# Patient Record
Sex: FEMALE | Race: WHITE | Employment: OTHER | URBAN - METROPOLITAN AREA
[De-identification: names, ages, dates, MRNs, and addresses within clinical notes are randomized per-mention and may not be internally consistent; named-entity substitution may affect disease eponyms.]

---

## 2019-10-19 ENCOUNTER — APPOINTMENT (OUTPATIENT)
Dept: LAB | Age: 64
End: 2019-10-19
Attending: FAMILY MEDICINE
Payer: MEDICARE

## 2019-10-19 ENCOUNTER — OFFICE VISIT (OUTPATIENT)
Dept: FAMILY MEDICINE CLINIC | Facility: CLINIC | Age: 64
End: 2019-10-19
Payer: MEDICARE

## 2019-10-19 VITALS
SYSTOLIC BLOOD PRESSURE: 125 MMHG | TEMPERATURE: 98 F | HEART RATE: 64 BPM | HEIGHT: 67 IN | BODY MASS INDEX: 27 KG/M2 | WEIGHT: 172 LBS | DIASTOLIC BLOOD PRESSURE: 71 MMHG

## 2019-10-19 DIAGNOSIS — Z00.00 WELCOME TO MEDICARE PREVENTIVE VISIT: Primary | ICD-10-CM

## 2019-10-19 DIAGNOSIS — E78.00 HYPERCHOLESTEREMIA: ICD-10-CM

## 2019-10-19 DIAGNOSIS — Z90.711 HISTORY OF PARTIAL HYSTERECTOMY: ICD-10-CM

## 2019-10-19 DIAGNOSIS — Z12.31 ENCOUNTER FOR SCREENING MAMMOGRAM FOR BREAST CANCER: ICD-10-CM

## 2019-10-19 DIAGNOSIS — Z13.6 SCREENING FOR CARDIOVASCULAR CONDITION: ICD-10-CM

## 2019-10-19 DIAGNOSIS — Z00.00 ENCOUNTER FOR ANNUAL HEALTH EXAMINATION: ICD-10-CM

## 2019-10-19 DIAGNOSIS — Z23 NEED FOR VACCINATION: ICD-10-CM

## 2019-10-19 DIAGNOSIS — Z11.59 NEED FOR HEPATITIS C SCREENING TEST: ICD-10-CM

## 2019-10-19 DIAGNOSIS — Z78.0 POSTMENOPAUSAL: ICD-10-CM

## 2019-10-19 DIAGNOSIS — Z00.00 WELCOME TO MEDICARE PREVENTIVE VISIT: ICD-10-CM

## 2019-10-19 DIAGNOSIS — I10 ESSENTIAL HYPERTENSION: ICD-10-CM

## 2019-10-19 DIAGNOSIS — Z12.11 SCREENING FOR COLON CANCER: ICD-10-CM

## 2019-10-19 DIAGNOSIS — E66.3 OVERWEIGHT (BMI 25.0-29.9): ICD-10-CM

## 2019-10-19 PROCEDURE — 80053 COMPREHEN METABOLIC PANEL: CPT

## 2019-10-19 PROCEDURE — 90662 IIV NO PRSV INCREASED AG IM: CPT | Performed by: FAMILY MEDICINE

## 2019-10-19 PROCEDURE — G0402 INITIAL PREVENTIVE EXAM: HCPCS | Performed by: FAMILY MEDICINE

## 2019-10-19 PROCEDURE — 90670 PCV13 VACCINE IM: CPT | Performed by: FAMILY MEDICINE

## 2019-10-19 PROCEDURE — 80061 LIPID PANEL: CPT

## 2019-10-19 PROCEDURE — 86803 HEPATITIS C AB TEST: CPT

## 2019-10-19 PROCEDURE — 36415 COLL VENOUS BLD VENIPUNCTURE: CPT

## 2019-10-19 PROCEDURE — G0008 ADMIN INFLUENZA VIRUS VAC: HCPCS | Performed by: FAMILY MEDICINE

## 2019-10-19 PROCEDURE — 93005 ELECTROCARDIOGRAM TRACING: CPT

## 2019-10-19 PROCEDURE — 93010 ELECTROCARDIOGRAM REPORT: CPT | Performed by: FAMILY MEDICINE

## 2019-10-19 PROCEDURE — G0009 ADMIN PNEUMOCOCCAL VACCINE: HCPCS | Performed by: FAMILY MEDICINE

## 2019-10-19 RX ORDER — KRILL/OM-3/DHA/EPA/PHOSPHO/AST 500-110 MG
CAPSULE ORAL
COMMUNITY

## 2019-10-19 RX ORDER — METOPROLOL SUCCINATE 100 MG/1
100 TABLET, EXTENDED RELEASE ORAL
Refills: 3 | COMMUNITY
Start: 2019-08-27 | End: 2019-10-23

## 2019-10-19 RX ORDER — ATORVASTATIN CALCIUM 20 MG/1
20 TABLET, FILM COATED ORAL
Refills: 3 | COMMUNITY
Start: 2019-08-27 | End: 2019-10-23

## 2019-10-19 NOTE — PROGRESS NOTES
HPI:   Cristian Ceballos  is a 70year old female who presents for a Medicare Initial Annual Wellness visit (Once after 12 month Medicare anniversary) .     New patient  Doing, worked as , retired at age 57  Moved to be closer to daughter Hypercholesteremia     Postmenopausal     History of partial hysterectomy    Wt Readings from Last 3 Encounters:  10/19/19 : 172 lb (78 kg)     Last Cholesterol Labs:   No results found for: CHOLEST, HDL, LDL, TRIG       Last Chemistry Labs:   No results f PERRL, conjunctiva/corneas clear, EOM's intact both eyes   Ears:  Normal TM's and external ear canals, both ears   Nose: Nares normal, septum midline,mucosa normal, no drainage or sinus tenderness   Throat: Lips, mucosa, and tongue normal; teeth and gums n Future    Overweight (BMI 25.0-29. 9)    Encounter for screening mammogram for breast cancer  -     [facility] JOELLEN 2D+3D SCREENING BILAT (CPT=77067/25206); Future    Postmenopausal  -     XR DEXA BONE DENSITOMETRY (CPT=77080);  Future    History of partial hystere without trying?: 2 - No  Has your appetite been poor?: No  How does the patient maintain a good energy level?: Daily Walks(and baby sitting 18 month [facility] )  How would you describe your daily physical activity?: Moderate  How would you describe your Maintenance if applicable     Immunizations (Update Immunization Activity if applicable)     Influenza  Covered Annually 10/19/2019 Please get every year    Pneumococcal 13 (Prevnar)  Covered Once after 65 No vaccine history found Please get once after you

## 2019-10-19 NOTE — PATIENT INSTRUCTIONS
Latha Schaeffer 's SCREENING SCHEDULE   Tests on this list are recommended by your physician but may not be covered, or covered at this frequency, by your insurer. Please check with your insurance carrier before scheduling to verify coverage.    Lonnie Rojas found for this or any previous visit. No flowsheet data found. Fecal Occult Blood   Covered Annually No results found for: FOB, OCCULTSTOOL No flowsheet data found.      Barium Enema-   uncomfortable but covered  Covered but uncomfortable   Glaucoma Scr Hepatitis B for Moderate/High Risk       No orders found for this or any previous visit.  Medium/high risk factors:   End-stage renal disease   Hemophiliacs who received Factor VIII or IX concentrates   Clients of institutions for the mentally retarded   Pe

## 2019-10-23 RX ORDER — ATORVASTATIN CALCIUM 20 MG/1
20 TABLET, FILM COATED ORAL
Qty: 90 TABLET | Refills: 3 | Status: SHIPPED | OUTPATIENT
Start: 2019-10-23 | End: 2020-10-03

## 2019-10-23 RX ORDER — METOPROLOL SUCCINATE 100 MG/1
100 TABLET, EXTENDED RELEASE ORAL
Qty: 90 TABLET | Refills: 3 | Status: SHIPPED | OUTPATIENT
Start: 2019-10-23 | End: 2020-10-03

## 2020-10-03 ENCOUNTER — OFFICE VISIT (OUTPATIENT)
Dept: FAMILY MEDICINE CLINIC | Facility: CLINIC | Age: 65
End: 2020-10-03
Payer: MEDICARE

## 2020-10-03 VITALS
SYSTOLIC BLOOD PRESSURE: 117 MMHG | WEIGHT: 180 LBS | DIASTOLIC BLOOD PRESSURE: 67 MMHG | TEMPERATURE: 98 F | HEIGHT: 67 IN | HEART RATE: 62 BPM | BODY MASS INDEX: 28.25 KG/M2

## 2020-10-03 DIAGNOSIS — Z12.11 COLON CANCER SCREENING: ICD-10-CM

## 2020-10-03 DIAGNOSIS — E78.00 HYPERCHOLESTEREMIA: ICD-10-CM

## 2020-10-03 DIAGNOSIS — Z78.0 POSTMENOPAUSAL: ICD-10-CM

## 2020-10-03 DIAGNOSIS — E66.3 OVERWEIGHT (BMI 25.0-29.9): ICD-10-CM

## 2020-10-03 DIAGNOSIS — Z12.31 VISIT FOR SCREENING MAMMOGRAM: ICD-10-CM

## 2020-10-03 DIAGNOSIS — I10 ESSENTIAL HYPERTENSION: ICD-10-CM

## 2020-10-03 DIAGNOSIS — Z90.711 HISTORY OF PARTIAL HYSTERECTOMY: ICD-10-CM

## 2020-10-03 DIAGNOSIS — Z23 NEED FOR PNEUMOCOCCAL VACCINATION: ICD-10-CM

## 2020-10-03 DIAGNOSIS — Z12.31 ENCOUNTER FOR SCREENING MAMMOGRAM FOR BREAST CANCER: Primary | ICD-10-CM

## 2020-10-03 DIAGNOSIS — Z00.00 ENCOUNTER FOR ANNUAL HEALTH EXAMINATION: ICD-10-CM

## 2020-10-03 PROCEDURE — 90732 PPSV23 VACC 2 YRS+ SUBQ/IM: CPT | Performed by: FAMILY MEDICINE

## 2020-10-03 PROCEDURE — G0009 ADMIN PNEUMOCOCCAL VACCINE: HCPCS | Performed by: FAMILY MEDICINE

## 2020-10-03 PROCEDURE — G0439 PPPS, SUBSEQ VISIT: HCPCS | Performed by: FAMILY MEDICINE

## 2020-10-03 RX ORDER — ATORVASTATIN CALCIUM 20 MG/1
20 TABLET, FILM COATED ORAL
Qty: 90 TABLET | Refills: 3 | Status: SHIPPED | OUTPATIENT
Start: 2020-10-03 | End: 2020-10-18 | Stop reason: DRUGHIGH

## 2020-10-03 RX ORDER — METOPROLOL SUCCINATE 100 MG/1
100 TABLET, EXTENDED RELEASE ORAL
Qty: 90 TABLET | Refills: 3 | Status: SHIPPED | OUTPATIENT
Start: 2020-10-03 | End: 2021-10-10

## 2020-10-03 NOTE — PATIENT INSTRUCTIONS
Bijan Arellano 's SCREENING SCHEDULE   Tests on this list are recommended by your physician but may not be covered, or covered at this frequency, by your insurer. Please check with your insurance carrier before scheduling to verify coverage.    PREVEN more often if abnormal Colonoscopy due on 12/29/2003 Update [facility] if applicable    Flex Sigmoidoscopy Screen  Covered every 5 years No results found for this or any previous visit. No flowsheet data found.      Fecal Occult Blood   Covered Catie birthday    Pneumococcal 23 (Pneumovax)  Covered Once after 65 No orders found for this or any previous visit. Please get once after your 65th birthday    Hepatitis B for Moderate/High Risk       No orders found for this or any previous visit.  Medium/high

## 2020-10-03 NOTE — PROGRESS NOTES
HPI:   Leslee Samaniego  is a 75year old female who presents for a Medicare Subsequent Annual Wellness visit (Pt already had Initial Annual Wellness).        Annual Physical due on 10/03/2021        Fall/Risk Assessment   She has been screened for Fall 94 10/19/2019          Last Chemistry Labs:   Lab Results   Component Value Date    AST 18 10/19/2019    ALT 27 10/19/2019    CA 9.7 10/19/2019    ALB 4.1 10/19/2019    CREATSERUM 0.70 10/19/2019     (H) 10/19/2019        CBC  (most recent labs)   N about where sounds come from: No I misunderstand some words in a sentence and need to ask people to repeat themselves: No   I especially have trouble understanding the speech of women and children: No I have trouble understanding the speaker in a large lisa color, texture, turgor normal, no rashes or lesions   Lymph nodes: Cervical, supraclavicular, and axillary nodes normal   Neurologic: Normal    and Breasts:  normal appearance, no masses or tenderness, Inspection negative, No nipple retraction or dimpling, cancer    - [facility] JOELLEN 2D+3D SCREENING BILAT (CPT=77067/88278); Future    5. Overweight (BMI 25.0-29. 9)  Highly recommend to lose weight. Discussed good dietary and eating habits as well as increasing vegetable and fruit intake.   Recommending avoiding foods patient  PREVENTATIVE SERVICES  INDICATIONS AND SCHEDULE Internal Lab or Procedure External Lab or Procedure   Diabetes Screening      HbgA1C   Annually No results found for: A1C No flowsheet data found.     Fasting Blood Sugar (FSB)Annually Glucose (mg/dL) Moderate/High Risk No vaccine history found Medium/high risk factors:   End-stage renal disease   Hemophiliacs who received Factor VIII or IX concentrates   Clients of institutions for the mentally retarded   Persons who live in the same house as a HepB vi

## 2020-10-12 ENCOUNTER — LAB ENCOUNTER (OUTPATIENT)
Dept: LAB | Age: 65
End: 2020-10-12
Attending: FAMILY MEDICINE
Payer: MEDICARE

## 2020-10-12 DIAGNOSIS — E78.00 HYPERCHOLESTEREMIA: ICD-10-CM

## 2020-10-12 DIAGNOSIS — I10 ESSENTIAL HYPERTENSION: ICD-10-CM

## 2020-10-12 PROCEDURE — 36415 COLL VENOUS BLD VENIPUNCTURE: CPT

## 2020-10-12 PROCEDURE — 80053 COMPREHEN METABOLIC PANEL: CPT

## 2020-10-12 PROCEDURE — 80061 LIPID PANEL: CPT

## 2020-10-18 DIAGNOSIS — R73.9 HYPERGLYCEMIA: Primary | ICD-10-CM

## 2020-10-18 DIAGNOSIS — E78.00 HYPERCHOLESTEREMIA: ICD-10-CM

## 2020-10-18 RX ORDER — ATORVASTATIN CALCIUM 10 MG/1
10 TABLET, FILM COATED ORAL NIGHTLY
Qty: 90 TABLET | Refills: 1 | Status: SHIPPED | OUTPATIENT
Start: 2020-10-18 | End: 2021-01-25

## 2020-10-20 ENCOUNTER — TELEPHONE (OUTPATIENT)
Dept: FAMILY MEDICINE CLINIC | Facility: CLINIC | Age: 65
End: 2020-10-20

## 2020-10-20 NOTE — TELEPHONE ENCOUNTER
Patient informed of results ( identified name and ) and recommendations, as per provider's result note. Patient voiced understanding and agrees.        Provided information to call Central scheduling at [phone] for fasting labs    Will  new R

## 2020-10-20 NOTE — TELEPHONE ENCOUNTER
Called pt but was not available wanted to inform her of her lab results     Liver kidney functions are good.    Cholesterol is excellent and I would recommend reducing atorvastatin dose to 10 mg once daily.  Fasting glucose is slightly elevated and I would

## 2021-01-18 ENCOUNTER — LAB ENCOUNTER (OUTPATIENT)
Dept: LAB | Age: 66
End: 2021-01-18
Attending: FAMILY MEDICINE
Payer: MEDICARE

## 2021-01-18 DIAGNOSIS — E78.00 HYPERCHOLESTEREMIA: ICD-10-CM

## 2021-01-18 DIAGNOSIS — R73.9 HYPERGLYCEMIA: ICD-10-CM

## 2021-01-18 LAB
ALBUMIN SERPL-MCNC: 4 G/DL (ref 3.4–5)
ALBUMIN/GLOB SERPL: 1.1 {RATIO} (ref 1–2)
ALP LIVER SERPL-CCNC: 61 U/L
ALT SERPL-CCNC: 26 U/L
ANION GAP SERPL CALC-SCNC: 3 MMOL/L (ref 0–18)
AST SERPL-CCNC: 15 U/L (ref 15–37)
BILIRUB SERPL-MCNC: 0.9 MG/DL (ref 0.1–2)
BUN BLD-MCNC: 20 MG/DL (ref 7–18)
BUN/CREAT SERPL: 25.6 (ref 10–20)
CALCIUM BLD-MCNC: 9.6 MG/DL (ref 8.5–10.1)
CHLORIDE SERPL-SCNC: 107 MMOL/L (ref 98–112)
CHOLEST SMN-MCNC: 129 MG/DL (ref ?–200)
CO2 SERPL-SCNC: 29 MMOL/L (ref 21–32)
CREAT BLD-MCNC: 0.78 MG/DL
EST. AVERAGE GLUCOSE BLD GHB EST-MCNC: 131 MG/DL (ref 68–126)
GLOBULIN PLAS-MCNC: 3.7 G/DL (ref 2.8–4.4)
GLUCOSE BLD-MCNC: 118 MG/DL (ref 70–99)
HBA1C MFR BLD HPLC: 6.2 % (ref ?–5.7)
HDLC SERPL-MCNC: 56 MG/DL (ref 40–59)
LDLC SERPL CALC-MCNC: 53 MG/DL (ref ?–100)
M PROTEIN MFR SERPL ELPH: 7.7 G/DL (ref 6.4–8.2)
NONHDLC SERPL-MCNC: 73 MG/DL (ref ?–130)
OSMOLALITY SERPL CALC.SUM OF ELEC: 292 MOSM/KG (ref 275–295)
PATIENT FASTING Y/N/NP: YES
PATIENT FASTING Y/N/NP: YES
POTASSIUM SERPL-SCNC: 4.3 MMOL/L (ref 3.5–5.1)
SODIUM SERPL-SCNC: 139 MMOL/L (ref 136–145)
TRIGL SERPL-MCNC: 100 MG/DL (ref 30–149)
VLDLC SERPL CALC-MCNC: 20 MG/DL (ref 0–30)

## 2021-01-18 PROCEDURE — 36415 COLL VENOUS BLD VENIPUNCTURE: CPT

## 2021-01-18 PROCEDURE — 83036 HEMOGLOBIN GLYCOSYLATED A1C: CPT

## 2021-01-18 PROCEDURE — 80061 LIPID PANEL: CPT

## 2021-01-18 PROCEDURE — 80053 COMPREHEN METABOLIC PANEL: CPT

## 2021-01-25 DIAGNOSIS — E78.00 HYPERCHOLESTEREMIA: ICD-10-CM

## 2021-01-25 RX ORDER — ATORVASTATIN CALCIUM 10 MG/1
10 TABLET, FILM COATED ORAL NIGHTLY
Qty: 90 TABLET | Refills: 3 | Status: SHIPPED | OUTPATIENT
Start: 2021-01-25

## 2021-10-09 DIAGNOSIS — I10 ESSENTIAL HYPERTENSION: ICD-10-CM

## 2021-10-10 RX ORDER — METOPROLOL SUCCINATE 100 MG/1
100 TABLET, EXTENDED RELEASE ORAL DAILY
Qty: 90 TABLET | Refills: 0 | Status: SHIPPED | OUTPATIENT
Start: 2021-10-10 | End: 2021-10-11

## 2021-10-11 ENCOUNTER — OFFICE VISIT (OUTPATIENT)
Dept: FAMILY MEDICINE CLINIC | Facility: CLINIC | Age: 66
End: 2021-10-11
Payer: MEDICARE

## 2021-10-11 ENCOUNTER — LAB ENCOUNTER (OUTPATIENT)
Dept: LAB | Age: 66
End: 2021-10-11
Attending: FAMILY MEDICINE
Payer: MEDICARE

## 2021-10-11 VITALS
SYSTOLIC BLOOD PRESSURE: 127 MMHG | BODY MASS INDEX: 28.25 KG/M2 | WEIGHT: 180 LBS | HEART RATE: 75 BPM | TEMPERATURE: 97 F | HEIGHT: 67 IN | DIASTOLIC BLOOD PRESSURE: 80 MMHG

## 2021-10-11 DIAGNOSIS — Z23 FLU VACCINE NEED: ICD-10-CM

## 2021-10-11 DIAGNOSIS — Z90.711 HISTORY OF PARTIAL HYSTERECTOMY: ICD-10-CM

## 2021-10-11 DIAGNOSIS — Z00.00 NORMAL BREAST EXAM: ICD-10-CM

## 2021-10-11 DIAGNOSIS — E78.00 HYPERCHOLESTEREMIA: ICD-10-CM

## 2021-10-11 DIAGNOSIS — E66.3 OVERWEIGHT (BMI 25.0-29.9): ICD-10-CM

## 2021-10-11 DIAGNOSIS — Z23 NEED FOR SHINGLES VACCINE: ICD-10-CM

## 2021-10-11 DIAGNOSIS — Z12.31 ENCOUNTER FOR SCREENING MAMMOGRAM FOR BREAST CANCER: ICD-10-CM

## 2021-10-11 DIAGNOSIS — R73.03 PREDIABETES: ICD-10-CM

## 2021-10-11 DIAGNOSIS — I10 ESSENTIAL HYPERTENSION: Primary | ICD-10-CM

## 2021-10-11 DIAGNOSIS — Z12.11 COLON CANCER SCREENING: ICD-10-CM

## 2021-10-11 DIAGNOSIS — Z00.00 ENCOUNTER FOR ANNUAL HEALTH EXAMINATION: ICD-10-CM

## 2021-10-11 DIAGNOSIS — Z78.0 POSTMENOPAUSAL: ICD-10-CM

## 2021-10-11 PROCEDURE — 80053 COMPREHEN METABOLIC PANEL: CPT

## 2021-10-11 PROCEDURE — 36415 COLL VENOUS BLD VENIPUNCTURE: CPT

## 2021-10-11 PROCEDURE — G0439 PPPS, SUBSEQ VISIT: HCPCS | Performed by: FAMILY MEDICINE

## 2021-10-11 PROCEDURE — 83036 HEMOGLOBIN GLYCOSYLATED A1C: CPT

## 2021-10-11 PROCEDURE — 80061 LIPID PANEL: CPT

## 2021-10-11 RX ORDER — METOPROLOL SUCCINATE 100 MG/1
100 TABLET, EXTENDED RELEASE ORAL DAILY
Qty: 90 TABLET | Refills: 3 | Status: SHIPPED | OUTPATIENT
Start: 2021-10-11

## 2021-10-11 NOTE — PATIENT INSTRUCTIONS
Kitty Ching 's SCREENING SCHEDULE   Tests on this list are recommended by your physician but may not be covered, or covered at this frequency, by your insurer. Please check with your insurance carrier before scheduling to verify coverage.    PREV done   Pap and Pelvic    Pap   Covered every 2 years for women at normal risk;  Annually if at high risk -  No recommendations at this time    Chlamydia Annually if high risk -  No recommendations at this time   Screening Mammogram    Mammogram     Recommen

## 2021-10-11 NOTE — PROGRESS NOTES
HPI:   Elyssa Bell  is a 77year old female who presents for a Medicare Subsequent Annual Wellness visit (Pt already had Initial Annual Wellness).     Doing well  [employer] grad daughter  Retired as    Annual Physical due on 10/11/20 Encounters:  10/11/21 : 180 lb (81.6 kg)  10/03/20 : 180 lb (81.6 kg)  10/19/19 : 172 lb (78 kg)     Last Cholesterol Labs:   Lab Results   Component Value Date    CHOLEST 129 01/18/2021    HDL 56 01/18/2021    LDL 53 01/18/2021    TRIG 100 01/18/2021 No I have to strain to understand conversations: No   I have to worry about missing the telephone ring or doorbell: No I have trouble hearing conversations in a noisy background such as a crowded room or restaurant: No   I get confused about where sounds c symmetric   Skin: Skin color, texture, turgor normal, no rashes or lesions   Lymph nodes: Cervical, supraclavicular, and axillary nodes normal   Neurologic: Normal    and Breasts:  normal appearance, no masses or tenderness, Inspection negative, No nipple Overweight (BMI 25.0-29. 9)  Highly recommend to lose weight. Discussed good dietary and eating habits as well as increasing vegetable and fruit intake. Recommending avoiding foods high in fat content.   Recommend exercising at least 30-40 minutes 5-6 days months if never tested or if previously tested but not diagnosed with pre-diabetes   One screening every 6 months if diagnosed with pre-diabetes Lab Results   Component Value Date     (H) 01/18/2021        Cardiovascular Disease Screening    Lipid P Influenza Covered once per flu season  Please get every year 09/06/2021  No recommendations at this time    Pneumococcal Each vaccine ([id number] & [id number]) covered once after 65 Prevnar 13: 10/19/2019    [id number]: 10/03/2020     No recommendation

## 2022-03-21 ENCOUNTER — OFFICE VISIT (OUTPATIENT)
Dept: FAMILY MEDICINE CLINIC | Facility: CLINIC | Age: 67
End: 2022-03-21
Payer: MEDICARE

## 2022-03-21 ENCOUNTER — LAB ENCOUNTER (OUTPATIENT)
Dept: LAB | Age: 67
End: 2022-03-21
Attending: FAMILY MEDICINE
Payer: MEDICARE

## 2022-03-21 VITALS
HEART RATE: 67 BPM | HEIGHT: 67 IN | SYSTOLIC BLOOD PRESSURE: 139 MMHG | BODY MASS INDEX: 27.15 KG/M2 | DIASTOLIC BLOOD PRESSURE: 76 MMHG | TEMPERATURE: 97 F | WEIGHT: 173 LBS

## 2022-03-21 DIAGNOSIS — E78.00 HYPERCHOLESTEREMIA: ICD-10-CM

## 2022-03-21 DIAGNOSIS — I10 ESSENTIAL HYPERTENSION: Primary | ICD-10-CM

## 2022-03-21 DIAGNOSIS — R73.03 PREDIABETES: ICD-10-CM

## 2022-03-21 DIAGNOSIS — N81.4 CYSTOCELE WITH PROLAPSE: ICD-10-CM

## 2022-03-21 DIAGNOSIS — I10 ESSENTIAL HYPERTENSION: ICD-10-CM

## 2022-03-21 DIAGNOSIS — E66.3 OVERWEIGHT (BMI 25.0-29.9): ICD-10-CM

## 2022-03-21 LAB
ALBUMIN SERPL-MCNC: 4.1 G/DL (ref 3.4–5)
ALBUMIN/GLOB SERPL: 1.2 {RATIO} (ref 1–2)
ALP LIVER SERPL-CCNC: 65 U/L
ALT SERPL-CCNC: 31 U/L
ANION GAP SERPL CALC-SCNC: 6 MMOL/L (ref 0–18)
AST SERPL-CCNC: 18 U/L (ref 15–37)
BILIRUB SERPL-MCNC: 0.9 MG/DL (ref 0.1–2)
BUN BLD-MCNC: 17 MG/DL (ref 7–18)
BUN/CREAT SERPL: 25.8 (ref 10–20)
CALCIUM BLD-MCNC: 9.5 MG/DL (ref 8.5–10.1)
CHLORIDE SERPL-SCNC: 107 MMOL/L (ref 98–112)
CHOLEST SERPL-MCNC: 146 MG/DL (ref ?–200)
CO2 SERPL-SCNC: 29 MMOL/L (ref 21–32)
CREAT BLD-MCNC: 0.66 MG/DL
EST. AVERAGE GLUCOSE BLD GHB EST-MCNC: 131 MG/DL (ref 68–126)
FASTING PATIENT LIPID ANSWER: YES
FASTING STATUS PATIENT QL REPORTED: YES
GLOBULIN PLAS-MCNC: 3.3 G/DL (ref 2.8–4.4)
GLUCOSE BLD-MCNC: 116 MG/DL (ref 70–99)
HBA1C MFR BLD: 6.2 % (ref ?–5.7)
HDLC SERPL-MCNC: 62 MG/DL (ref 40–59)
LDLC SERPL CALC-MCNC: 69 MG/DL (ref ?–100)
NONHDLC SERPL-MCNC: 84 MG/DL (ref ?–130)
OSMOLALITY SERPL CALC.SUM OF ELEC: 297 MOSM/KG (ref 275–295)
POTASSIUM SERPL-SCNC: 4.2 MMOL/L (ref 3.5–5.1)
PROT SERPL-MCNC: 7.4 G/DL (ref 6.4–8.2)
SODIUM SERPL-SCNC: 142 MMOL/L (ref 136–145)
TRIGL SERPL-MCNC: 80 MG/DL (ref 30–149)
VLDLC SERPL CALC-MCNC: 12 MG/DL (ref 0–30)

## 2022-03-21 PROCEDURE — 80053 COMPREHEN METABOLIC PANEL: CPT

## 2022-03-21 PROCEDURE — 36415 COLL VENOUS BLD VENIPUNCTURE: CPT

## 2022-03-21 PROCEDURE — 80061 LIPID PANEL: CPT

## 2022-03-21 PROCEDURE — 99214 OFFICE O/P EST MOD 30 MIN: CPT | Performed by: FAMILY MEDICINE

## 2022-03-21 PROCEDURE — 83036 HEMOGLOBIN GLYCOSYLATED A1C: CPT

## 2022-03-21 RX ORDER — ATORVASTATIN CALCIUM 10 MG/1
10 TABLET, FILM COATED ORAL NIGHTLY
Qty: 90 TABLET | Refills: 3 | Status: SHIPPED | OUTPATIENT
Start: 2022-03-21

## 2022-05-27 ENCOUNTER — OFFICE VISIT (OUTPATIENT)
Dept: UROLOGY | Facility: HOSPITAL | Age: 67
End: 2022-05-27
Attending: OBSTETRICS & GYNECOLOGY
Payer: MEDICARE

## 2022-05-27 VITALS — HEIGHT: 63 IN | WEIGHT: 173 LBS | RESPIRATION RATE: 20 BRPM | BODY MASS INDEX: 30.65 KG/M2

## 2022-05-27 DIAGNOSIS — N81.84 PELVIC MUSCLE WASTING: ICD-10-CM

## 2022-05-27 DIAGNOSIS — R35.1 NOCTURIA: ICD-10-CM

## 2022-05-27 DIAGNOSIS — N95.2 POSTMENOPAUSAL ATROPHIC VAGINITIS: ICD-10-CM

## 2022-05-27 DIAGNOSIS — N99.3 PROLAPSE OF VAGINAL VAULT AFTER HYSTERECTOMY: Primary | ICD-10-CM

## 2022-05-27 LAB
BILIRUB UR QL: NEGATIVE
CLARITY UR: CLEAR
COLOR UR: YELLOW
CONTROL RUN WITHIN 24 HOURS?: YES
GLUCOSE UR-MCNC: NEGATIVE MG/DL
KETONES UR-MCNC: NEGATIVE MG/DL
LEUKOCYTE ESTERASE UR QL STRIP.AUTO: NEGATIVE
LEUKOCYTE ESTERASE URINE: NEGATIVE
NITRITE UR QL STRIP.AUTO: NEGATIVE
NITRITE URINE: NEGATIVE
PH UR: 7 [PH] (ref 5–8)
PROT UR-MCNC: NEGATIVE MG/DL
SP GR UR STRIP: 1.01 (ref 1–1.03)
UROBILINOGEN UR STRIP-ACNC: <2
VIT C UR-MCNC: NEGATIVE MG/DL

## 2022-05-27 PROCEDURE — 81002 URINALYSIS NONAUTO W/O SCOPE: CPT | Performed by: OBSTETRICS & GYNECOLOGY

## 2022-05-27 PROCEDURE — 99202 OFFICE O/P NEW SF 15 MIN: CPT

## 2022-05-27 PROCEDURE — 81001 URINALYSIS AUTO W/SCOPE: CPT | Performed by: OBSTETRICS & GYNECOLOGY

## 2022-05-27 PROCEDURE — 87086 URINE CULTURE/COLONY COUNT: CPT | Performed by: OBSTETRICS & GYNECOLOGY

## 2022-05-27 PROCEDURE — 51701 INSERT BLADDER CATHETER: CPT | Performed by: OBSTETRICS & GYNECOLOGY

## 2022-05-30 ENCOUNTER — TELEPHONE (OUTPATIENT)
Dept: UROLOGY | Facility: HOSPITAL | Age: 67
End: 2022-05-30

## 2022-05-30 NOTE — TELEPHONE ENCOUNTER
TC to pt w/ test results   No answer, unable to leave message   Urine testing wnl, will inform pt at follow up visit

## 2022-08-25 ENCOUNTER — OFFICE VISIT (OUTPATIENT)
Dept: UROLOGY | Facility: HOSPITAL | Age: 67
End: 2022-08-25
Payer: MEDICARE

## 2022-08-25 VITALS — WEIGHT: 173 LBS | HEIGHT: 63 IN | BODY MASS INDEX: 30.65 KG/M2 | RESPIRATION RATE: 18 BRPM

## 2022-08-25 DIAGNOSIS — R35.1 NOCTURIA: ICD-10-CM

## 2022-08-25 DIAGNOSIS — N99.3 PROLAPSE OF VAGINAL VAULT AFTER HYSTERECTOMY: Primary | ICD-10-CM

## 2022-08-25 LAB
CONTROL RUN WITHIN 24 HOURS?: YES
LEUKOCYTE ESTERASE URINE: NEGATIVE
NITRITE URINE: NEGATIVE

## 2022-08-25 PROCEDURE — 51729 CYSTOMETROGRAM W/VP&UP: CPT

## 2022-08-25 PROCEDURE — 51741 ELECTRO-UROFLOWMETRY FIRST: CPT

## 2022-08-25 PROCEDURE — 51797 INTRAABDOMINAL PRESSURE TEST: CPT

## 2022-08-25 PROCEDURE — 81002 URINALYSIS NONAUTO W/O SCOPE: CPT

## 2022-08-25 PROCEDURE — 51784 ANAL/URINARY MUSCLE STUDY: CPT

## 2022-08-25 NOTE — PROCEDURES
Patient here for urodynamic testing. Procedure explained and confirmed by patient. See evaluation form for results. Both verbal and written discharge instructions were given. Patient tolerated procedure well and will follow up with Dr. Stephan Boland in office on  @  8:30 am.    URODYNAMIC EVALUATION    PATIENT HISTORY:    Prolapse:  Yes (patient biggest bother- splints to void)  MALU:  No  UUI:  Yes (patient reports leakage when getting up in am)  Nocturia:  1  Frequency:  >3 hours  Incomplete Emptying:  No  Constipation:  No (Bowel regimen discussed and handout given)  Last void prior to UDS testin hours   Current urge to void? Moderate  OAB meds stopped prior to test?  NA  Other symptoms? Surgery? 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 EXAM:  Urinalysis Dip: Blood trace,Nitrates -negative, leukocytes -negative                    Urovesico Junction ( >30 degrees ):  [x]  Mobile  []  Fixed    Perineal Sensation:  [x]  Normal  []  Abnormal    Additional Notes:    PROLAPSE (past introitus):  [x]  Yes  []  No  Prolapse reduced for testing?   [x]  Yes  []  No  [x]  Pessary #2 ring with support   [x]  Half  Speculum  []  Proctoswab  []  Vag Packing    Additional Notes:    UROFLOWMETRY:  Voided Volume:                129          mL  Maximum Flow Rate:                     23        mL/sec  Average flow rate:                    1          mL/sec  Post-void Residual:                80           mL  Pattern:  [x]  Normal  []  Poor flow     [x]  Intermittent  []  Other  Void:   [x]  Typical  []  Atypical    Additional Notes:Uroflow obtained unreduced   CYSTOMETRY:  Urethral Catheter:  Fr 7 / tdoc  Abd Catheter:     Fr 7 / tdoc   Infusion:  Water Rate 50 mL/min  Temp:  Room  Position:  [x] Sit  []  Stand  []  Supine  First sensation:   25 mL  First desire to void:   210 mL  Strong desire to void:  339 mL  Maximum cystometric capacity:   350 mL  Detrusor Activity:  [x]  Unstable   []  Stable  Urge leakage? []  Yes [x]  No  Volume at 1st unhibited detrusor cont:  184 mL  Detrusor instability provoked by:    [x]  Spontaneous []  Coughing  [x]  Filling  []  Valsalva  []  Other    Additional Notes:      URETHRAL FUNCTION:  Valsava (vesical) Leak Point Pressures:    Volume Leak Point Pressure Leak? Cough Valsalva      100mL 106 85    cm H2O []  Yes [x]  No         REDUCED:half speculum  Volume Leak Point Pressure Leak? Cough Valsalva      100mL 132 102    cm H2O []  Yes [x]  No   200mL 127  100   cm H2O []  Yes [x]  No   300mL 119 100    cm H2O []  Yes [x]  No   350mL 140 104  cm H2O []  Yes [x]  No     Genuine Stress Incontinence demonstrated? []  Yes  [x]  No    Resting Urethral Pressure Profile:     Functional Urethral Length:    0.4 cm         0.3    cm     Maximum UCP:          29 cm            26 cm       PRESSURE/FLOW STUDY:  Voided volume:   336     mL  Maximum flow rate:      28  mL/sec  Pressure Detrusor (at maximum flow):     38       cm H2O  Post void residual:        6       mL  Voiding mechanism:  []  Abnormal  [x]  Normal  []  Strain to void   []  Weak detrusor  Void:   [x]  Typical   []  Atypical    Additional Notes:Pressure flow study obtained reduced with #2 ring with support. #2 ring with support removed at completion of UDS testing.    EMG:  [x]  Reactive []  Non-Reactive    8/25/2022 9:26 AM     PERFORMED BY:  Mia Gomez RN        URODYNAMIC PHYSICIAN INTERPRETATION    IMPRESSION:   129/80cc & 336/6cc   FCI 350cc  DO @ 184cc  No MALU    Post-Procedure Diagnoses: Detrusor instability [N31.9]    Comments:      Momo Garcia DO   8/25/2022   10:26 AM

## 2022-09-30 ENCOUNTER — OFFICE VISIT (OUTPATIENT)
Dept: UROLOGY | Facility: HOSPITAL | Age: 67
End: 2022-09-30
Attending: OBSTETRICS & GYNECOLOGY

## 2022-09-30 VITALS — HEIGHT: 63 IN | BODY MASS INDEX: 30.65 KG/M2 | WEIGHT: 173 LBS | RESPIRATION RATE: 18 BRPM

## 2022-09-30 DIAGNOSIS — N81.85 CERVICAL STUMP PROLAPSE: ICD-10-CM

## 2022-09-30 DIAGNOSIS — N81.84 PELVIC MUSCLE WASTING: ICD-10-CM

## 2022-09-30 DIAGNOSIS — N99.3 PROLAPSE OF VAGINAL VAULT AFTER HYSTERECTOMY: Primary | ICD-10-CM

## 2022-09-30 DIAGNOSIS — N95.2 POSTMENOPAUSAL ATROPHIC VAGINITIS: ICD-10-CM

## 2022-09-30 PROCEDURE — 57160 INSERT PESSARY/OTHER DEVICE: CPT | Performed by: OBSTETRICS & GYNECOLOGY

## 2022-09-30 PROCEDURE — 99212 OFFICE O/P EST SF 10 MIN: CPT

## 2022-09-30 RX ORDER — ESTRADIOL 0.1 MG/G
CREAM VAGINAL
Qty: 42.5 G | Refills: 3 | Status: SHIPPED | OUTPATIENT
Start: 2022-09-30

## 2022-09-30 NOTE — PROGRESS NOTES
Pt here for pessary trial due to bulge  Wants to exhaust conservative options  Denies  UUI complaints  Denies MALU complaints  No UTIs  Nocturia x 1  Bowels regular      She is not currently interested in surgical management of her pelvic organ prolapse. Wants to try office mgmt for pessary care    Vitals:   09/30/22  0816   Resp: 18       GENERAL EXAM:  GENERAL:  NAD  HEAD: NC/AT  EYES: symmetric bilaterally. No icterus. Sclera w/o injection  NECK: no masses  LUNGS:  Normal resp effort  ABDOMEN:  Soft, no mass  MUSK: normal gait, ROM wnl. No edema  PSYCH: A&Ox3. Recent and remote memory intact    PELVIC EXAM:  Ext. Gen: +atrophy, no lesions, diffuse erythema  Urethra: +atrophy, nontender  Bladder:+fullness, nontender  Vagina: +atrophy  Cervix: no bleeding, no lesions, nontender  Uterus: absent  Perineum: nontender  Anus: wnl  Rectum: defer    PELVIC SUPPORT:  [place]:  3 (cervix to introitus)  Ant:  3  Post:  2    A #3 longstem gellhorn was placed without difficulty. 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 Discussed pessary management including benefits and risks. Discussed importance of keeping regularly scheduled pessary checks in prevention of complications related to pessary use. Discussed mgmt of vulvovaginal atrophy with vaginal estrogen cream. Reviewed associated benefits, risks, alternatives, and goals.  Recommend low dose twice weekly mgmt     Continue pessary management, office care  Begin vaginal estrogen cream twice weekly  Daily pelvic exercises  Call with s/sx of UTI, problems with pessary   All questions answered  She understands and agrees to plan    Return in about 2 weeks (around 10/14/2022) for pessary care, sooner prn .     Radha Junior PA-C

## 2022-09-30 NOTE — PROGRESS NOTES
Pt presents w/ initial c/o bulge  Urodynamic testing undergone without complication. Results reviewed with patient  129/80cc & 336/6cc   senior care 350cc  DO @ 184cc  No MALU    Discussed with patient mgmt options for POP, DO/UUI  Biggest bother is bulge  Interested in conservative options  Tried pessary in the past, poor fit    Discussed dietary and behavioral modifications for mgmt of urinary symptoms  Discussed weight management and benefits of weight loss on urinary symptoms  Reviewed AUA/SUFU guidelines on mgmt of non-neurogenic OAB  Discussed pharmacologic and nonpharmacologic mgmt options of urinary symptoms - reviewed risks, benefits, alternatives, and goals of treatment  Discussed specific risks related to OAB meds including, but not limited to dry mouth, constipation, blurry vision, cognitive changes, and BP elevation. Thorough discussion of surgical risks, benefits, and alternatives including, but not limited to bleeding/clots, infection, injury to nearby organs (urethra, bladder, ureters, bowel, blood vessels), mesh erosion, dyspareunia, de tina UUI, worsening MALU, recurrence, voiding dysfunction, and pain. All questions answered.   Pt will proceed bladder diet/drill, pessary trial  Vag estrogen twice weekly    Follow up pessary care    Dr. Lm Kinney

## 2022-09-30 NOTE — PATIENT INSTRUCTIONS
PAT [address] [place] [address] PELVIC MEDICINE    Fingertip Application Method for Estrogen Vaginal Cream    1. Wash you hands with soap and water and dry thoroughly. 2.  Squeeze out enough cream from the tube to cover 1/2 of your index finger. (See figure 1)    3. Locate the vaginal opening (See figure 2). Immediately above the vagina is the urethra (a small opening where urine is eliminated from your body). The urethra may not be as easily identified as the vagina because the opening is much smaller, however, use the diagram to determine its approximate location. 4.  Carefully spread the cream onto the external vaginal/urethral area (See figure 2). As the cream is spread, some may be gently inserted into the vagina; however, it is not necessary to push the cream high into your vagina.

## 2022-10-10 ENCOUNTER — LAB ENCOUNTER (OUTPATIENT)
Dept: LAB | Age: 67
End: 2022-10-10
Attending: FAMILY MEDICINE
Payer: MEDICARE

## 2022-10-10 ENCOUNTER — OFFICE VISIT (OUTPATIENT)
Dept: FAMILY MEDICINE CLINIC | Facility: CLINIC | Age: 67
End: 2022-10-10
Payer: MEDICARE

## 2022-10-10 VITALS
BODY MASS INDEX: 29.59 KG/M2 | WEIGHT: 167 LBS | HEART RATE: 61 BPM | TEMPERATURE: 96 F | DIASTOLIC BLOOD PRESSURE: 68 MMHG | HEIGHT: 63 IN | SYSTOLIC BLOOD PRESSURE: 113 MMHG

## 2022-10-10 DIAGNOSIS — Z23 NEED FOR INFLUENZA VACCINATION: ICD-10-CM

## 2022-10-10 DIAGNOSIS — E78.00 HYPERCHOLESTEREMIA: ICD-10-CM

## 2022-10-10 DIAGNOSIS — I10 ESSENTIAL HYPERTENSION: Primary | ICD-10-CM

## 2022-10-10 DIAGNOSIS — R73.03 PREDIABETES: ICD-10-CM

## 2022-10-10 DIAGNOSIS — E66.3 OVERWEIGHT (BMI 25.0-29.9): ICD-10-CM

## 2022-10-10 DIAGNOSIS — I10 ESSENTIAL HYPERTENSION: ICD-10-CM

## 2022-10-10 LAB
ALBUMIN SERPL-MCNC: 3.9 G/DL (ref 3.4–5)
ALBUMIN/GLOB SERPL: 1 {RATIO} (ref 1–2)
ALP LIVER SERPL-CCNC: 62 U/L
ALT SERPL-CCNC: 24 U/L
ANION GAP SERPL CALC-SCNC: 11 MMOL/L (ref 0–18)
AST SERPL-CCNC: 19 U/L (ref 15–37)
BILIRUB SERPL-MCNC: 0.9 MG/DL (ref 0.1–2)
BUN BLD-MCNC: 19 MG/DL (ref 7–18)
BUN/CREAT SERPL: 27.5 (ref 10–20)
CALCIUM BLD-MCNC: 9.4 MG/DL (ref 8.5–10.1)
CHLORIDE SERPL-SCNC: 108 MMOL/L (ref 98–112)
CHOLEST SERPL-MCNC: 147 MG/DL (ref ?–200)
CO2 SERPL-SCNC: 23 MMOL/L (ref 21–32)
CREAT BLD-MCNC: 0.69 MG/DL
EST. AVERAGE GLUCOSE BLD GHB EST-MCNC: 120 MG/DL (ref 68–126)
FASTING PATIENT LIPID ANSWER: YES
FASTING STATUS PATIENT QL REPORTED: YES
GFR SERPLBLD BASED ON 1.73 SQ M-ARVRAT: 94 ML/MIN/1.73M2 (ref 60–?)
GLOBULIN PLAS-MCNC: 3.8 G/DL (ref 2.8–4.4)
GLUCOSE BLD-MCNC: 104 MG/DL (ref 70–99)
HBA1C MFR BLD: 5.8 % (ref ?–5.7)
HDLC SERPL-MCNC: 59 MG/DL (ref 40–59)
LDLC SERPL CALC-MCNC: 73 MG/DL (ref ?–100)
NONHDLC SERPL-MCNC: 88 MG/DL (ref ?–130)
OSMOLALITY SERPL CALC.SUM OF ELEC: 297 MOSM/KG (ref 275–295)
POTASSIUM SERPL-SCNC: 4 MMOL/L (ref 3.5–5.1)
PROT SERPL-MCNC: 7.7 G/DL (ref 6.4–8.2)
SODIUM SERPL-SCNC: 142 MMOL/L (ref 136–145)
TRIGL SERPL-MCNC: 78 MG/DL (ref 30–149)
VLDLC SERPL CALC-MCNC: 12 MG/DL (ref 0–30)

## 2022-10-10 PROCEDURE — 99214 OFFICE O/P EST MOD 30 MIN: CPT | Performed by: FAMILY MEDICINE

## 2022-10-10 PROCEDURE — 36415 COLL VENOUS BLD VENIPUNCTURE: CPT

## 2022-10-10 PROCEDURE — 80061 LIPID PANEL: CPT

## 2022-10-10 PROCEDURE — 80053 COMPREHEN METABOLIC PANEL: CPT

## 2022-10-10 PROCEDURE — G0008 ADMIN INFLUENZA VIRUS VAC: HCPCS | Performed by: FAMILY MEDICINE

## 2022-10-10 PROCEDURE — 90662 IIV NO PRSV INCREASED AG IM: CPT | Performed by: FAMILY MEDICINE

## 2022-10-10 PROCEDURE — 83036 HEMOGLOBIN GLYCOSYLATED A1C: CPT

## 2022-10-10 RX ORDER — METOPROLOL SUCCINATE 100 MG/1
100 TABLET, EXTENDED RELEASE ORAL DAILY
Qty: 90 TABLET | Refills: 3 | Status: SHIPPED | OUTPATIENT
Start: 2022-10-10

## 2022-10-18 DIAGNOSIS — E78.00 HYPERCHOLESTEREMIA: ICD-10-CM

## 2022-10-18 RX ORDER — ATORVASTATIN CALCIUM 10 MG/1
10 TABLET, FILM COATED ORAL NIGHTLY
Qty: 90 TABLET | Refills: 3 | Status: SHIPPED | OUTPATIENT
Start: 2022-10-18

## 2022-10-21 ENCOUNTER — OFFICE VISIT (OUTPATIENT)
Dept: UROLOGY | Facility: HOSPITAL | Age: 67
End: 2022-10-21
Attending: STUDENT IN AN ORGANIZED HEALTH CARE EDUCATION/TRAINING PROGRAM
Payer: MEDICARE

## 2022-10-21 VITALS — BODY MASS INDEX: 29.59 KG/M2 | RESPIRATION RATE: 18 BRPM | WEIGHT: 167 LBS | HEIGHT: 63 IN

## 2022-10-21 DIAGNOSIS — N81.85 CERVICAL STUMP PROLAPSE: ICD-10-CM

## 2022-10-21 DIAGNOSIS — N95.2 POSTMENOPAUSAL ATROPHIC VAGINITIS: ICD-10-CM

## 2022-10-21 DIAGNOSIS — N81.84 PELVIC MUSCLE WASTING: ICD-10-CM

## 2022-10-21 DIAGNOSIS — N99.3 PROLAPSE OF VAGINAL VAULT AFTER HYSTERECTOMY: Primary | ICD-10-CM

## 2022-10-21 PROCEDURE — 99212 OFFICE O/P EST SF 10 MIN: CPT

## 2022-11-18 ENCOUNTER — OFFICE VISIT (OUTPATIENT)
Dept: UROLOGY | Facility: HOSPITAL | Age: 67
End: 2022-11-18
Attending: STUDENT IN AN ORGANIZED HEALTH CARE EDUCATION/TRAINING PROGRAM
Payer: MEDICARE

## 2022-11-18 VITALS
HEIGHT: 63 IN | SYSTOLIC BLOOD PRESSURE: 130 MMHG | BODY MASS INDEX: 29.59 KG/M2 | RESPIRATION RATE: 18 BRPM | DIASTOLIC BLOOD PRESSURE: 62 MMHG | WEIGHT: 167 LBS

## 2022-11-18 DIAGNOSIS — N81.84 PELVIC MUSCLE WASTING: ICD-10-CM

## 2022-11-18 DIAGNOSIS — N99.3 PROLAPSE OF VAGINAL VAULT AFTER HYSTERECTOMY: Primary | ICD-10-CM

## 2022-11-18 DIAGNOSIS — N95.2 POSTMENOPAUSAL ATROPHIC VAGINITIS: ICD-10-CM

## 2022-11-18 PROCEDURE — 99212 OFFICE O/P EST SF 10 MIN: CPT

## 2023-01-06 ENCOUNTER — OFFICE VISIT (OUTPATIENT)
Dept: UROLOGY | Facility: HOSPITAL | Age: 68
End: 2023-01-06
Attending: STUDENT IN AN ORGANIZED HEALTH CARE EDUCATION/TRAINING PROGRAM
Payer: MEDICARE

## 2023-01-06 VITALS — WEIGHT: 167 LBS | TEMPERATURE: 98 F | HEIGHT: 63 IN | BODY MASS INDEX: 29.59 KG/M2

## 2023-01-06 DIAGNOSIS — N95.2 POSTMENOPAUSAL ATROPHIC VAGINITIS: ICD-10-CM

## 2023-01-06 DIAGNOSIS — R35.1 NOCTURIA: ICD-10-CM

## 2023-01-06 DIAGNOSIS — N81.84 PELVIC MUSCLE WASTING: ICD-10-CM

## 2023-01-06 DIAGNOSIS — N99.3 PROLAPSE OF VAGINAL VAULT AFTER HYSTERECTOMY: Primary | ICD-10-CM

## 2023-01-06 PROCEDURE — 99212 OFFICE O/P EST SF 10 MIN: CPT | Performed by: PHYSICIAN ASSISTANT

## 2023-03-10 ENCOUNTER — OFFICE VISIT (OUTPATIENT)
Dept: UROLOGY | Facility: HOSPITAL | Age: 68
End: 2023-03-10
Attending: PHYSICIAN ASSISTANT
Payer: MEDICARE

## 2023-03-10 VITALS
WEIGHT: 167 LBS | BODY MASS INDEX: 29.59 KG/M2 | DIASTOLIC BLOOD PRESSURE: 70 MMHG | SYSTOLIC BLOOD PRESSURE: 120 MMHG | HEIGHT: 63 IN

## 2023-03-10 DIAGNOSIS — N81.84 PELVIC MUSCLE WASTING: ICD-10-CM

## 2023-03-10 DIAGNOSIS — R35.1 NOCTURIA: ICD-10-CM

## 2023-03-10 DIAGNOSIS — N99.3 PROLAPSE OF VAGINAL VAULT AFTER HYSTERECTOMY: ICD-10-CM

## 2023-03-10 DIAGNOSIS — N90.89 VULVAR IRRITATION: Primary | ICD-10-CM

## 2023-03-10 DIAGNOSIS — N95.2 POSTMENOPAUSAL ATROPHIC VAGINITIS: ICD-10-CM

## 2023-03-10 PROCEDURE — 99212 OFFICE O/P EST SF 10 MIN: CPT | Performed by: PHYSICIAN ASSISTANT

## 2023-03-10 RX ORDER — CLOTRIMAZOLE AND BETAMETHASONE DIPROPIONATE 10; .64 MG/G; MG/G
1 CREAM TOPICAL 2 TIMES DAILY
Qty: 45 G | Refills: 1 | Status: SHIPPED | OUTPATIENT
Start: 2023-03-10 | End: 2023-03-17

## 2023-05-12 ENCOUNTER — OFFICE VISIT (OUTPATIENT)
Dept: UROLOGY | Facility: HOSPITAL | Age: 68
End: 2023-05-12
Attending: PHYSICIAN ASSISTANT
Payer: MEDICARE

## 2023-05-12 VITALS — WEIGHT: 167 LBS | BODY MASS INDEX: 29.59 KG/M2 | HEIGHT: 63 IN | RESPIRATION RATE: 18 BRPM

## 2023-05-12 DIAGNOSIS — N99.3 PROLAPSE OF VAGINAL VAULT AFTER HYSTERECTOMY: Primary | ICD-10-CM

## 2023-05-12 DIAGNOSIS — R35.1 NOCTURIA: ICD-10-CM

## 2023-05-12 DIAGNOSIS — N95.2 POSTMENOPAUSAL ATROPHIC VAGINITIS: ICD-10-CM

## 2023-05-12 DIAGNOSIS — N81.84 PELVIC MUSCLE WASTING: ICD-10-CM

## 2023-05-12 PROCEDURE — 99212 OFFICE O/P EST SF 10 MIN: CPT

## 2023-09-01 ENCOUNTER — OFFICE VISIT (OUTPATIENT)
Dept: UROLOGY | Facility: HOSPITAL | Age: 68
End: 2023-09-01
Attending: PHYSICIAN ASSISTANT
Payer: MEDICARE

## 2023-09-01 VITALS
SYSTOLIC BLOOD PRESSURE: 110 MMHG | BODY MASS INDEX: 29.59 KG/M2 | HEIGHT: 63 IN | DIASTOLIC BLOOD PRESSURE: 64 MMHG | WEIGHT: 167 LBS

## 2023-09-01 DIAGNOSIS — N95.2 POSTMENOPAUSAL ATROPHIC VAGINITIS: ICD-10-CM

## 2023-09-01 DIAGNOSIS — N99.3 PROLAPSE OF VAGINAL VAULT AFTER HYSTERECTOMY: Primary | ICD-10-CM

## 2023-09-01 DIAGNOSIS — N81.84 PELVIC MUSCLE WASTING: ICD-10-CM

## 2023-09-01 DIAGNOSIS — R35.1 NOCTURIA: ICD-10-CM

## 2023-09-01 PROCEDURE — 99212 OFFICE O/P EST SF 10 MIN: CPT | Performed by: PHYSICIAN ASSISTANT

## 2023-10-12 ENCOUNTER — OFFICE VISIT (OUTPATIENT)
Dept: FAMILY MEDICINE CLINIC | Facility: CLINIC | Age: 68
End: 2023-10-12

## 2023-10-12 ENCOUNTER — LAB ENCOUNTER (OUTPATIENT)
Dept: LAB | Age: 68
End: 2023-10-12
Attending: FAMILY MEDICINE
Payer: MEDICARE

## 2023-10-12 VITALS
WEIGHT: 163 LBS | BODY MASS INDEX: 28.88 KG/M2 | HEIGHT: 63 IN | SYSTOLIC BLOOD PRESSURE: 124 MMHG | TEMPERATURE: 98 F | HEART RATE: 67 BPM | DIASTOLIC BLOOD PRESSURE: 75 MMHG

## 2023-10-12 DIAGNOSIS — E66.3 OVERWEIGHT (BMI 25.0-29.9): ICD-10-CM

## 2023-10-12 DIAGNOSIS — Z12.11 COLON CANCER SCREENING: ICD-10-CM

## 2023-10-12 DIAGNOSIS — I10 ESSENTIAL HYPERTENSION: ICD-10-CM

## 2023-10-12 DIAGNOSIS — R73.03 PREDIABETES: ICD-10-CM

## 2023-10-12 DIAGNOSIS — Z90.711 HISTORY OF PARTIAL HYSTERECTOMY: ICD-10-CM

## 2023-10-12 DIAGNOSIS — N81.4 CYSTOCELE WITH PROLAPSE: ICD-10-CM

## 2023-10-12 DIAGNOSIS — Z00.00 ENCOUNTER FOR ANNUAL HEALTH EXAMINATION: Primary | ICD-10-CM

## 2023-10-12 DIAGNOSIS — E78.00 HYPERCHOLESTEREMIA: ICD-10-CM

## 2023-10-12 LAB
ALBUMIN SERPL-MCNC: 4.1 G/DL (ref 3.4–5)
ALBUMIN/GLOB SERPL: 1.1 {RATIO} (ref 1–2)
ALP LIVER SERPL-CCNC: 61 U/L
ALT SERPL-CCNC: 27 U/L
ANION GAP SERPL CALC-SCNC: 7 MMOL/L (ref 0–18)
AST SERPL-CCNC: 20 U/L (ref 15–37)
BILIRUB SERPL-MCNC: 1.3 MG/DL (ref 0.1–2)
BUN BLD-MCNC: 19 MG/DL (ref 7–18)
BUN/CREAT SERPL: 23.5 (ref 10–20)
CALCIUM BLD-MCNC: 9.8 MG/DL (ref 8.5–10.1)
CHLORIDE SERPL-SCNC: 106 MMOL/L (ref 98–112)
CHOLEST SERPL-MCNC: 149 MG/DL (ref ?–200)
CO2 SERPL-SCNC: 29 MMOL/L (ref 21–32)
CREAT BLD-MCNC: 0.81 MG/DL
EGFRCR SERPLBLD CKD-EPI 2021: 79 ML/MIN/1.73M2 (ref 60–?)
EST. AVERAGE GLUCOSE BLD GHB EST-MCNC: 131 MG/DL (ref 68–126)
FASTING PATIENT LIPID ANSWER: YES
FASTING STATUS PATIENT QL REPORTED: YES
GLOBULIN PLAS-MCNC: 3.9 G/DL (ref 2.8–4.4)
GLUCOSE BLD-MCNC: 121 MG/DL (ref 70–99)
HBA1C MFR BLD: 6.2 % (ref ?–5.7)
HDLC SERPL-MCNC: 64 MG/DL (ref 40–59)
LDLC SERPL CALC-MCNC: 68 MG/DL (ref ?–100)
NONHDLC SERPL-MCNC: 85 MG/DL (ref ?–130)
OSMOLALITY SERPL CALC.SUM OF ELEC: 298 MOSM/KG (ref 275–295)
POTASSIUM SERPL-SCNC: 3.9 MMOL/L (ref 3.5–5.1)
PROT SERPL-MCNC: 8 G/DL (ref 6.4–8.2)
SODIUM SERPL-SCNC: 142 MMOL/L (ref 136–145)
TRIGL SERPL-MCNC: 91 MG/DL (ref 30–149)
VLDLC SERPL CALC-MCNC: 14 MG/DL (ref 0–30)

## 2023-10-12 PROCEDURE — G0008 ADMIN INFLUENZA VIRUS VAC: HCPCS | Performed by: FAMILY MEDICINE

## 2023-10-12 PROCEDURE — G0439 PPPS, SUBSEQ VISIT: HCPCS | Performed by: FAMILY MEDICINE

## 2023-10-12 PROCEDURE — 90662 IIV NO PRSV INCREASED AG IM: CPT | Performed by: FAMILY MEDICINE

## 2023-10-12 PROCEDURE — 80061 LIPID PANEL: CPT

## 2023-10-12 PROCEDURE — 83036 HEMOGLOBIN GLYCOSYLATED A1C: CPT

## 2023-10-12 PROCEDURE — 1126F AMNT PAIN NOTED NONE PRSNT: CPT | Performed by: FAMILY MEDICINE

## 2023-10-12 PROCEDURE — 99213 OFFICE O/P EST LOW 20 MIN: CPT | Performed by: FAMILY MEDICINE

## 2023-10-12 PROCEDURE — 36415 COLL VENOUS BLD VENIPUNCTURE: CPT

## 2023-10-12 PROCEDURE — 80053 COMPREHEN METABOLIC PANEL: CPT

## 2023-10-12 RX ORDER — ATORVASTATIN CALCIUM 10 MG/1
10 TABLET, FILM COATED ORAL NIGHTLY
Qty: 90 TABLET | Refills: 3 | Status: SHIPPED | OUTPATIENT
Start: 2023-10-12

## 2023-10-12 RX ORDER — METOPROLOL SUCCINATE 100 MG/1
100 TABLET, EXTENDED RELEASE ORAL DAILY
Qty: 90 TABLET | Refills: 3 | Status: SHIPPED | OUTPATIENT
Start: 2023-10-12

## 2023-12-04 ENCOUNTER — OFFICE VISIT (OUTPATIENT)
Dept: UROLOGY | Facility: HOSPITAL | Age: 68
End: 2023-12-04
Attending: PHYSICIAN ASSISTANT
Payer: MEDICARE

## 2023-12-04 VITALS — HEIGHT: 63 IN | RESPIRATION RATE: 18 BRPM | BODY MASS INDEX: 29.59 KG/M2 | WEIGHT: 167 LBS

## 2023-12-04 DIAGNOSIS — N99.3 PROLAPSE OF VAGINAL VAULT AFTER HYSTERECTOMY: Primary | ICD-10-CM

## 2023-12-04 DIAGNOSIS — N90.4 VULVAR DYSTROPHY: ICD-10-CM

## 2023-12-04 DIAGNOSIS — N81.84 PELVIC MUSCLE WASTING: ICD-10-CM

## 2023-12-04 DIAGNOSIS — N95.2 POSTMENOPAUSAL ATROPHIC VAGINITIS: ICD-10-CM

## 2023-12-04 DIAGNOSIS — R35.1 NOCTURIA: ICD-10-CM

## 2023-12-04 PROCEDURE — 99212 OFFICE O/P EST SF 10 MIN: CPT

## 2023-12-04 RX ORDER — CLOBETASOL PROPIONATE 0.5 MG/G
0.05 OINTMENT TOPICAL 2 TIMES DAILY
COMMUNITY

## 2023-12-04 NOTE — PROGRESS NOTES
Pt presents to follow up bulge  Doing well with #4 long stem gellhorn pessary, office care  Reports pessary is comfortable   Denies discharge  Denies bleeding  Denies s/sx of UTI  Bowels regular  Pt is using vaginal estrogen cream twice weekly  Clobetasol prn     Happy with pessary, feels supported  She is not currently interested in surgical management of her pelvic organ prolapse. Urogynecology Summary:  Urogynecology Summary  Prolapse: No  MALU: No  Urge Incontinence: Yes (Reports occurs occasionally, often over night)  Nocturia Frequency: 1  Frequency: Greater than 3 hours (Every 3-4hours)  Incomplete emptying: No  Constipation: No  Wears pad day?: 1  Wears Pad Night?: 0  Activities are limited by UI/POP?: No  Currently Sexually Active: No      Vitals:  Vitals:    12/04/23 0935   Resp: 18       GENERAL EXAM:  GENERAL:  NAD  HEAD: NC/AT  EYES: symmetric bilaterally. No icterus. Sclera w/o injection  NECK: no masses  LUNGS:  Normal resp effort  ABDOMEN:  Soft, no mass  MUSK: normal gait, ROM wnl. No edema  PSYCH: A&Ox3. Recent and remote memory intact    PELVIC EXAM:  Ext. Gen: +atrophy, bilateral red lesions on superior labia  Urethra: +atrophy, nontender  Bladder:+fullness, nontender  Vagina: +atrophy, no lesions, no bleeding   Cervix: no bleeding, no lesions, slight erythema, nontender  Uterus: absent  Perineum: nontender  Anus: wnl  Rectum: defer     PELVIC SUPPORT:  [place]:  3 (cervix to introitus)  Ant:  3  Post:  2    Her pessary was removed, cleaned, and reinserted w/o difficulty    Impression/Plan:    ICD-10-CM    1. Prolapse of vaginal vault after hysterectomy  N99.3       2. Postmenopausal atrophic vaginitis  N95.2       3. Pelvic muscle wasting  N81.84       4. Nocturia  R35.1       5. 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 Reviewed associated benefits, risks, alternatives, and goals.  Recommend low dose twice weekly mgmt   Discussed management of pelvic organ prolapse including but not limited to behavioral modifications, conservative options, and surgical management. Discussed pessary management including benefits and risks. Discussed importance of keeping regularly scheduled pessary checks in prevention of complications related to pessary use. Discussed management options for vulvar dystrophy. Discussed chronic nature of symptoms. Discussed steroid treatments and vulvar care, discussed associated treatment risks and benefits. Discussed need for future vulvar biopsy if sx persist despite mgmt as prescribed. Continue pessary management, office care  Clobetasol twice a week  Continue vag estrogen twice weekly  Daily pelvic exercises  Bowel management reviewed  Call with s/sx of UTI, problems with pessary   All questions answered  Pt understands and agrees to plan       Return in about 3 months (around 3/4/2024) for pessary care, sooner prn .       Ron Bryant PA-C

## 2024-03-11 ENCOUNTER — OFFICE VISIT (OUTPATIENT)
Dept: UROLOGY | Facility: HOSPITAL | Age: 69
End: 2024-03-11
Attending: PHYSICIAN ASSISTANT
Payer: MEDICARE

## 2024-03-11 VITALS — WEIGHT: 167 LBS | HEIGHT: 63 IN | RESPIRATION RATE: 16 BRPM | BODY MASS INDEX: 29.59 KG/M2

## 2024-03-11 DIAGNOSIS — N95.2 POSTMENOPAUSAL ATROPHIC VAGINITIS: ICD-10-CM

## 2024-03-11 DIAGNOSIS — N90.4 VULVAR DYSTROPHY: ICD-10-CM

## 2024-03-11 DIAGNOSIS — R35.1 NOCTURIA: ICD-10-CM

## 2024-03-11 DIAGNOSIS — N81.84 PELVIC MUSCLE WASTING: ICD-10-CM

## 2024-03-11 DIAGNOSIS — N99.3 PROLAPSE OF VAGINAL VAULT AFTER HYSTERECTOMY: Primary | ICD-10-CM

## 2024-03-11 PROCEDURE — 99212 OFFICE O/P EST SF 10 MIN: CPT

## 2024-03-11 NOTE — PROGRESS NOTES
Pt presents to follow up bulge  Doing well with #4 long stem gellhorn pessary, office care  Reports pessary is comfortable   Denies discharge  Denies bleeding  Denies s/sx of UTI  Bowels regular   Pt is using vaginal estrogen cream twice weekly  Clobetasol twice a week, symptoms controlled     Happy with pessary, feels supported  She is not currently interested in surgical management of her pelvic organ prolapse.    Urogynecology Summary:  Urogynecology Summary  Prolapse: Yes (pessary supportive)  MALU: No  Urge Incontinence: No  Nocturia Frequency: 1  Frequency: Greater than 3 hours  Incomplete emptying: No  Constipation: No  Activities are limited by UI/POP?: No      Vitals:  Vitals:    03/11/24 0918   Resp: 16       GENERAL EXAM:  GENERAL:  NAD  HEAD: NC/AT  EYES: symmetric bilaterally. No icterus. Sclera w/o injection  NECK: no masses  LUNGS:  Normal resp effort  ABDOMEN:  Soft, no mass  MUSK: normal gait, ROM wnl. No edema  PSYCH: A&Ox3. Recent and remote memory intact    PELVIC EXAM:  Ext. Gen: +atrophy, bilateral red lesions on superior labia  Urethra: +atrophy, nontender  Bladder:+fullness, nontender  Vagina: +atrophy, no lesions, no bleeding   Cervix: no bleeding, no lesions, slight erythema, nontender  Uterus: absent  Perineum: nontender  Anus: wnl  Rectum: defer     PELVIC SUPPORT:  [place]:  3 (cervix to introitus)  Ant:  3  Post:  2    Her pessary was removed, cleaned, and reinserted w/o difficulty    Impression/Plan:    ICD-10-CM    1. Prolapse of vaginal vault after hysterectomy  N99.3       2. Postmenopausal atrophic vaginitis  N95.2       3. Pelvic muscle wasting  N81.84       4. Nocturia  R35.1       5. 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 Reviewed associated benefits, risks, alternatives, and goals. Recommend low dose twice weekly mgmt   Discussed management of pelvic organ prolapse including but not limited to  behavioral modifications, conservative options, and surgical management.   Discussed pessary management including benefits and risks. Discussed importance of keeping regularly scheduled pessary checks in prevention of complications related to pessary use.     Continue pessary management, office care  Continue vag estrogen twice weekly  Daily pelvic exercises  Bowel management reviewed  Call with s/sx of UTI, problems with pessary   All questions answered  Pt understands and agrees to plan       Return in about 3 months (around 6/11/2024) for pessary care, sooner prn .      SHAQUILLE VenegasC

## 2024-06-14 ENCOUNTER — OFFICE VISIT (OUTPATIENT)
Dept: UROLOGY | Facility: HOSPITAL | Age: 69
End: 2024-06-14
Attending: PHYSICIAN ASSISTANT
Payer: MEDICARE

## 2024-06-14 VITALS — BODY MASS INDEX: 29.59 KG/M2 | HEIGHT: 63 IN | RESPIRATION RATE: 16 BRPM | WEIGHT: 167 LBS

## 2024-06-14 DIAGNOSIS — R35.1 NOCTURIA: ICD-10-CM

## 2024-06-14 DIAGNOSIS — N95.2 POSTMENOPAUSAL ATROPHIC VAGINITIS: ICD-10-CM

## 2024-06-14 DIAGNOSIS — N81.84 PELVIC MUSCLE WASTING: ICD-10-CM

## 2024-06-14 DIAGNOSIS — N99.3 PROLAPSE OF VAGINAL VAULT AFTER HYSTERECTOMY: Primary | ICD-10-CM

## 2024-06-14 DIAGNOSIS — N90.4 VULVAR DYSTROPHY: ICD-10-CM

## 2024-06-14 PROCEDURE — 99212 OFFICE O/P EST SF 10 MIN: CPT

## 2024-06-14 NOTE — PROGRESS NOTES
Pt presents to follow up bulge  Doing well with #4 long stem gellhorn pessary, office care  Reports pessary is comfortable   Denies discharge  Denies bleeding  Denies s/sx of UTI  Bowels regular  Pt is using vaginal estrogen cream twice weekly  Using clobetasol twice weekly, symptoms controlled     Happy with pessary, feels supported  She is not currently interested in surgical management of her pelvic organ prolapse.    Urogynecology Summary:  Urogynecology Summary  Prolapse: No  MALU: No  Urge Incontinence: No  Nocturia Frequency: 1  Frequency: Greater than 3 hours  Incomplete emptying: No  Constipation: No  Activities are limited by UI/POP?: No  Currently Sexually Active: No      Vitals:  Vitals:    06/14/24 0808   Resp: 16       GENERAL EXAM:  GENERAL:  NAD  HEAD: NC/AT  EYES: symmetric bilaterally. No icterus. Sclera w/o injection  NECK: no masses  LUNGS:  Normal resp effort  ABDOMEN:  Soft, no mass  MUSK: normal gait, ROM wnl. No edema  PSYCH: A&Ox3. Recent and remote memory intact    PELVIC EXAM: LYSSA Contreras, present for exam as a chaperone  Ext. Gen: +atrophy, bilateral red lesions on superior labia  Urethra: +atrophy, nontender  Bladder:+fullness, nontender  Vagina: +atrophy, no lesions, no bleeding   Cervix: no bleeding, no lesions, slight erythema, nontender  Uterus: absent  Perineum: nontender  Anus: wnl  Rectum: defer     PELVIC SUPPORT:  [place]:  3 (cervix to introitus)  Ant:  3  Post:  2    Her pessary was removed, cleaned, and reinserted w/o difficulty    Impression/Plan:    ICD-10-CM    1. Prolapse of vaginal vault after hysterectomy  N99.3       2. Postmenopausal atrophic vaginitis  N95.2       3. Pelvic muscle wasting  N81.84       4. Nocturia  R35.1       5. 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 Reviewed associated benefits, risks, alternatives, and goals. Recommend low dose twice weekly mgmt   Discussed management  of pelvic organ prolapse including but not limited to behavioral modifications, conservative options, and surgical management.   Discussed pessary management including benefits and risks. Discussed importance of keeping regularly scheduled pessary checks in prevention of complications related to pessary use.     Continue pessary management, office care  Continue vag estrogen twice weekly  Daily pelvic exercises  Bowel management reviewed  Call with s/sx of UTI, problems with pessary   All questions answered  Pt understands and agrees to plan       Return in about 3 months (around 9/14/2024) for pessary care, sooner prn .      Maria Del Carmen Earl PA-C      The 21st Century Cures Act makes medical notes like these available to patients in the interest of transparency. However, be advised this is a medical document. It is intended as peer to peer communication. It is written in medical language and may contain abbreviations or verbiage that are unfamiliar. It may appear blunt or direct. Medical documents are intended to carry relevant information, facts as evident, and the clinical opinion of the practitioner.

## 2024-09-27 ENCOUNTER — OFFICE VISIT (OUTPATIENT)
Dept: UROLOGY | Facility: HOSPITAL | Age: 69
End: 2024-09-27
Attending: PHYSICIAN ASSISTANT
Payer: MEDICARE

## 2024-09-27 VITALS — WEIGHT: 167 LBS | RESPIRATION RATE: 16 BRPM | BODY MASS INDEX: 29.59 KG/M2 | HEIGHT: 63 IN

## 2024-09-27 DIAGNOSIS — N99.3 PROLAPSE OF VAGINAL VAULT AFTER HYSTERECTOMY: Primary | ICD-10-CM

## 2024-09-27 DIAGNOSIS — N95.2 POSTMENOPAUSAL ATROPHIC VAGINITIS: ICD-10-CM

## 2024-09-27 DIAGNOSIS — N81.84 PELVIC MUSCLE WASTING: ICD-10-CM

## 2024-09-27 DIAGNOSIS — N90.4 VULVAR DYSTROPHY: ICD-10-CM

## 2024-09-27 PROCEDURE — 99212 OFFICE O/P EST SF 10 MIN: CPT

## 2024-09-27 NOTE — PROGRESS NOTES
Pt presents to follow up bulge  Doing well with #4 longstem gellhorn pessary, office care  Reports pessary is comfortable   Denies discharge  Denies bleeding  Denies s/sx of UTI  Bowels regular   Pt is using vaginal estrogen cream twice weekly  Using clobetasol twice a week     Happy with pessary, feels supported  She is not currently interested in surgical management of her pelvic organ prolapse.    Urogynecology Summary:  Urogynecology Summary  Prolapse: No  MALU: No  Urge Incontinence: No  Nocturia Frequency: 1  Frequency: Greater than 3 hours  Incomplete emptying: No  Constipation: No  Wears pad day?: 1 (\"just in case\")  Wears Pad Night?: 0  Activities are limited by UI/POP?: No  Currently Sexually Active: No      Vitals:  Vitals:    09/27/24 0939   Resp: 16       GENERAL EXAM:  GENERAL:  NAD  HEAD: NC/AT  EYES: symmetric bilaterally. No icterus. Sclera w/o injection  NECK: no masses  LUNGS:  Normal resp effort  ABDOMEN:  Soft, no mass  MUSK: normal gait, ROM wnl. No edema  PSYCH: A&Ox3. Recent and remote memory intact    PELVIC EXAM: LYSSA Contreras, present for exam as a chaperone  Ext. Gen: +atrophy, bilateral red lesions on superior labia  Urethra: +atrophy, nontender  Bladder:+fullness, nontender  Vagina: +atrophy, no lesions, no bleeding   Cervix: no bleeding, no lesions, slight erythema, nontender  Uterus: absent  Perineum: nontender  Anus: wnl  Rectum: defer     PELVIC SUPPORT:  [place]:  3 (cervix to introitus)  Ant:  3  Post:  2    Her pessary was removed, cleaned, and reinserted w/o difficulty    Impression/Plan:    ICD-10-CM    1. Prolapse of vaginal vault after hysterectomy  N99.3       2. Postmenopausal atrophic vaginitis  N95.2       3. Pelvic muscle wasting  N81.84       4. 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 Reviewed associated benefits, risks, alternatives, and goals. Recommend low dose twice weekly mgmt   Discussed  management of pelvic organ prolapse including but not limited to behavioral modifications, conservative options, and surgical management.   Discussed pessary management including benefits and risks. Discussed importance of keeping regularly scheduled pessary checks in prevention of complications related to pessary use.   Discussed management options for vulvar dystrophy. Discussed chronic nature of symptoms. Discussed steroid treatments and vulvar care, discussed associated treatment risks and benefits.   Discussed need for future vulvar biopsy if sx persist despite mgmt as prescribed.       Continue pessary management, office care  Continue vag estrogen twice weekly  Daily pelvic exercises  Bowel management reviewed  Call with s/sx of UTI, problems with pessary   All questions answered  Pt understands and agrees to plan       Return in about 3 months (around 12/27/2024) for pessary care, sooner prn .      Maria Del Carmen Earl PA-C      The 21st Century Cures Act makes medical notes like these available to patients in the interest of transparency. However, be advised this is a medical document. It is intended as peer to peer communication. It is written in medical language and may contain abbreviations or verbiage that are unfamiliar. It may appear blunt or direct. Medical documents are intended to carry relevant information, facts as evident, and the clinical opinion of the practitioner.

## 2024-09-28 DIAGNOSIS — E78.00 HYPERCHOLESTEREMIA: ICD-10-CM

## 2024-09-28 DIAGNOSIS — I10 ESSENTIAL HYPERTENSION: ICD-10-CM

## 2024-10-01 RX ORDER — METOPROLOL SUCCINATE 100 MG/1
100 TABLET, EXTENDED RELEASE ORAL DAILY
Qty: 90 TABLET | Refills: 0 | Status: SHIPPED | OUTPATIENT
Start: 2024-10-01

## 2024-10-01 RX ORDER — ATORVASTATIN CALCIUM 10 MG/1
10 TABLET, FILM COATED ORAL NIGHTLY
Qty: 90 TABLET | Refills: 0 | Status: SHIPPED | OUTPATIENT
Start: 2024-10-01

## 2024-10-17 ENCOUNTER — OFFICE VISIT (OUTPATIENT)
Dept: FAMILY MEDICINE CLINIC | Facility: CLINIC | Age: 69
End: 2024-10-17

## 2024-10-17 ENCOUNTER — LAB ENCOUNTER (OUTPATIENT)
Dept: LAB | Age: 69
End: 2024-10-17
Attending: FAMILY MEDICINE
Payer: MEDICARE

## 2024-10-17 VITALS
HEART RATE: 61 BPM | SYSTOLIC BLOOD PRESSURE: 108 MMHG | TEMPERATURE: 97 F | WEIGHT: 161 LBS | DIASTOLIC BLOOD PRESSURE: 68 MMHG | HEIGHT: 63 IN | BODY MASS INDEX: 28.53 KG/M2

## 2024-10-17 DIAGNOSIS — Z12.31 ENCOUNTER FOR SCREENING MAMMOGRAM FOR BREAST CANCER: ICD-10-CM

## 2024-10-17 DIAGNOSIS — R73.03 PREDIABETES: ICD-10-CM

## 2024-10-17 DIAGNOSIS — Z78.0 POSTMENOPAUSAL: ICD-10-CM

## 2024-10-17 DIAGNOSIS — I10 ESSENTIAL HYPERTENSION: ICD-10-CM

## 2024-10-17 DIAGNOSIS — Z90.711 HISTORY OF PARTIAL HYSTERECTOMY: ICD-10-CM

## 2024-10-17 DIAGNOSIS — Z00.00 ENCOUNTER FOR ANNUAL HEALTH EXAMINATION: Primary | ICD-10-CM

## 2024-10-17 DIAGNOSIS — E78.00 HYPERCHOLESTEREMIA: ICD-10-CM

## 2024-10-17 DIAGNOSIS — E66.3 OVERWEIGHT (BMI 25.0-29.9): ICD-10-CM

## 2024-10-17 DIAGNOSIS — Z12.11 COLON CANCER SCREENING: ICD-10-CM

## 2024-10-17 DIAGNOSIS — N99.3 PROLAPSE OF VAGINAL VAULT AFTER HYSTERECTOMY: ICD-10-CM

## 2024-10-17 PROBLEM — N81.4 CYSTOCELE WITH PROLAPSE: Status: RESOLVED | Noted: 2022-03-21 | Resolved: 2024-10-17

## 2024-10-17 LAB
ALBUMIN SERPL-MCNC: 4.7 G/DL (ref 3.2–4.8)
ALBUMIN/GLOB SERPL: 1.5 {RATIO} (ref 1–2)
ALP LIVER SERPL-CCNC: 59 U/L
ALT SERPL-CCNC: 20 U/L
ANION GAP SERPL CALC-SCNC: 7 MMOL/L (ref 0–18)
AST SERPL-CCNC: 17 U/L (ref ?–34)
BILIRUB SERPL-MCNC: 1.4 MG/DL (ref 0.2–1.1)
BUN BLD-MCNC: 18 MG/DL (ref 9–23)
BUN/CREAT SERPL: 25 (ref 10–20)
CALCIUM BLD-MCNC: 10.3 MG/DL (ref 8.7–10.4)
CHLORIDE SERPL-SCNC: 106 MMOL/L (ref 98–112)
CHOLEST SERPL-MCNC: 139 MG/DL (ref ?–200)
CO2 SERPL-SCNC: 29 MMOL/L (ref 21–32)
CREAT BLD-MCNC: 0.72 MG/DL
EGFRCR SERPLBLD CKD-EPI 2021: 90 ML/MIN/1.73M2 (ref 60–?)
EST. AVERAGE GLUCOSE BLD GHB EST-MCNC: 128 MG/DL (ref 68–126)
FASTING PATIENT LIPID ANSWER: YES
FASTING STATUS PATIENT QL REPORTED: YES
GLOBULIN PLAS-MCNC: 3.1 G/DL (ref 2–3.5)
GLUCOSE BLD-MCNC: 100 MG/DL (ref 70–99)
HBA1C MFR BLD: 6.1 % (ref ?–5.7)
HDLC SERPL-MCNC: 56 MG/DL (ref 40–59)
LDLC SERPL CALC-MCNC: 68 MG/DL (ref ?–100)
NONHDLC SERPL-MCNC: 83 MG/DL (ref ?–130)
OSMOLALITY SERPL CALC.SUM OF ELEC: 296 MOSM/KG (ref 275–295)
POTASSIUM SERPL-SCNC: 4.2 MMOL/L (ref 3.5–5.1)
PROT SERPL-MCNC: 7.8 G/DL (ref 5.7–8.2)
SODIUM SERPL-SCNC: 142 MMOL/L (ref 136–145)
TRIGL SERPL-MCNC: 73 MG/DL (ref 30–149)
VLDLC SERPL CALC-MCNC: 11 MG/DL (ref 0–30)

## 2024-10-17 PROCEDURE — 83036 HEMOGLOBIN GLYCOSYLATED A1C: CPT

## 2024-10-17 PROCEDURE — 80053 COMPREHEN METABOLIC PANEL: CPT

## 2024-10-17 PROCEDURE — 80061 LIPID PANEL: CPT

## 2024-10-17 PROCEDURE — 36415 COLL VENOUS BLD VENIPUNCTURE: CPT

## 2024-10-17 RX ORDER — ATORVASTATIN CALCIUM 10 MG/1
10 TABLET, FILM COATED ORAL NIGHTLY
Qty: 90 TABLET | Refills: 3 | Status: SHIPPED | OUTPATIENT
Start: 2024-10-17

## 2024-10-17 RX ORDER — METOPROLOL SUCCINATE 100 MG/1
100 TABLET, EXTENDED RELEASE ORAL DAILY
Qty: 90 TABLET | Refills: 3 | Status: SHIPPED | OUTPATIENT
Start: 2024-10-17

## 2024-10-17 NOTE — PROGRESS NOTES
Subjective:   Niki Cho is a 68 year old female who presents for a Medicare Wellness Visit charge within the last 11 months and Patient may not meet criteria for AWV: Please evaluate for correct coding and scheduled follow up of multiple significant but stable problems.   Wt Readings from Last 6 Encounters:   10/17/24 161 lb (73 kg)   09/27/24 167 lb (75.8 kg)   06/14/24 167 lb (75.8 kg)   03/11/24 167 lb (75.8 kg)   12/04/23 167 lb (75.8 kg)   10/12/23 163 lb (73.9 kg)     BP Readings from Last 3 Encounters:   10/17/24 108/68   10/12/23 124/75   09/01/23 110/64         History/Other:   Fall Risk Assessment:   She has been screened for Falls and is low risk.      Cognitive Assessment:   She had a completely normal cognitive assessment - see flowsheet entries     Functional Ability/Status:   Niki Cho has a completely normal functional assessment. See flowsheet for details.        Depression Screening (PHQ):  PHQ-2 SCORE: 0  , done 10/17/2024            Advanced Directives:   She does have a Living Will but we do NOT have it on file in Epic.    She does have a POA but we do NOT have it on file in Epic.    Discussed Advance Care Planning with patient (and family/surrogate if present). Standard forms made available to patient in After Visit Summary.      Patient Active Problem List   Diagnosis    Overweight (BMI 25.0-29.9)    Essential hypertension    Hypercholesteremia    Postmenopausal    History of partial hysterectomy    Prolapse of vaginal vault after hysterectomy    Prediabetes     Allergies:  She has No Known Allergies.    Current Medications:  Outpatient Medications Marked as Taking for the 10/17/24 encounter (Office Visit) with Darlene Kirby MD   Medication Sig    atorvastatin 10 MG Oral Tab Take 1 tablet (10 mg total) by mouth nightly.    metoprolol succinate  MG Oral Tablet 24 Hr Take 1 tablet (100 mg total) by mouth daily.    clobetasol 0.05 % External Ointment Apply 0.05 % topically  2 (two) times daily.    estradiol (ESTRACE) 0.1 MG/GM Vaginal Cream Apply 1/2 gram vaginally 2 times per week.    Multiple Vitamin (MULTI-VITAMIN) Oral Tab Take 1 tablet by mouth daily.    Krill Oil (OMEGA-3) 500 MG Oral Cap Take by mouth.       Medical History:  She  has a past medical history of Essential hypertension and Hyperlipidemia.  Surgical History:  She  has a past surgical history that includes hysterectomy.   Family History:  Her family history is not on file.  Social History:  She  reports that she has never smoked. She has never been exposed to tobacco smoke. She has never used smokeless tobacco. She reports that she does not drink alcohol and does not use drugs.    Tobacco:  She has never smoked tobacco.    CAGE Alcohol Screen:   CAGE screening score of 0 on 10/17/2024, showing low risk of alcohol abuse.      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     Objective:   Physical Exam  General Appearance:  Alert, cooperative, no distress, appears stated age   Head:  Normocephalic, without obvious abnormality, atraumatic   Eyes:  PERRL, conjunctiva/corneas clear, EOM's intact both eyes   Ears:  Normal TM's and external ear canals, both ears   Nose: Nares normal, septum midline,mucosa normal, no drainage or sinus tenderness   Throat: Lips, mucosa, and tongue normal; teeth and gums normal   Neck: Supple, symmetrical, trachea midline, no adenopathy;  thyroid: not enlarged, symmetric, no tenderness/mass/nodules; no carotid bruit or JVD   Back:   Symmetric, no curvature, ROM normal, no CVA tenderness   Lungs:   Clear to auscultation bilaterally, respirations unlabored   Heart:  Regular rate and rhythm, S1 and S2 normal, no murmur, rub, or gallop   Abdomen:   Soft, non-tender, bowel sounds active all four quadrants,  no masses, no organomegaly   Pelvic: Deferred   Extremities: Extremities normal, atraumatic, no  cyanosis or edema   Pulses: 2+ and symmetric   Skin: Skin color, texture, turgor normal, no rashes or lesions   Lymph nodes: Cervical, supraclavicular, and axillary nodes normal   Neurologic: Normal       /68   Pulse 61   Temp 97.1 °F (36.2 °C) (Temporal)   Ht 5' 3\" (1.6 m)   Wt 161 lb (73 kg)   BMI 28.52 kg/m²  Estimated body mass index is 28.52 kg/m² as calculated from the following:    Height as of this encounter: 5' 3\" (1.6 m).    Weight as of this encounter: 161 lb (73 kg).    Medicare Hearing Assessment:   Hearing Screening    Screening Method: Finger Rub  Finger Rub Result: Pass               Assessment & Plan:   Niki Cho is a 68 year old female who presents for a Medicare Assessment.     1. Encounter for annual health examination (Primary)  2. Essential hypertension  -     Detailed, Mod Complex (62227)  -     Comp Metabolic Panel (14); Future; Expected date: 10/17/2024  -     Metoprolol Succinate ER; Take 1 tablet (100 mg total) by mouth daily.  Dispense: 90 tablet; Refill: 3  3. Hypercholesteremia  -     Detailed, Mod Complex (78102)  -     Comp Metabolic Panel (14); Future; Expected date: 10/17/2024  -     Lipid Panel; Future; Expected date: 10/17/2024  -     Atorvastatin Calcium; Take 1 tablet (10 mg total) by mouth nightly.  Dispense: 90 tablet; Refill: 3  4. Postmenopausal  -     Dexa Scan/Bone Density screening (Screening allowed every 2 years); Future; Expected date: 10/17/2024  5. Encounter for screening mammogram for breast cancer  -     [facility] JOELLEN 2D+3D SCREENING BILAT (CPT=77067/48765); Future; Expected date: 10/17/2024  6. Colon cancer screening  -     Occult Blood, Fecal, FIT Immunoassay; Future; Expected date: 10/17/2024  7. Prediabetes  -     Detailed, Mod Complex (45000)  -     Hemoglobin A1C; Future; Expected date: 10/17/2024  8. History of partial hysterectomy  9. Overweight (BMI 25.0-29.9)  10. 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 Encounter for annual  health examination      2. Essential hypertension  Stable condition  Reviewed medications  Continue current medication management   All questions answered to the best of my ability.    - Detailed, Mod Complex (53206)  - Comp Metabolic Panel (14); Future  - metoprolol succinate  MG Oral Tablet 24 Hr; Take 1 tablet (100 mg total) by mouth daily.  Dispense: 90 tablet; Refill: 3    3. Hypercholesteremia  Stable condition  Reviewed medications  Continue current medication management   All questions answered to the best of my ability.    - Detailed, Mod Complex (88718)  - Comp Metabolic Panel (14); Future  - Lipid Panel; Future  - atorvastatin 10 MG Oral Tab; Take 1 tablet (10 mg total) by mouth nightly.  Dispense: 90 tablet; Refill: 3    4. Postmenopausal  Patient has previously declined test  - Dexa Scan/Bone Density screening (Screening allowed every 2 years); Future    5. Encounter for screening mammogram for breast cancer  Declined previous testing   - [employer] JOELLEN 2D+3D SCREENING BILAT (CPT=77067/08731); Future    6. Colon cancer screening    - Occult Blood, Fecal, FIT Immunoassay; Future    7. Prediabetes  Continue weight loss and lowering intake of simple carbohydrates   - Detailed, Mod Complex (11284)  - Hemoglobin A1C; Future    8. History of partial hysterectomy      9. Overweight (BMI 25.0-29.9)  Wt Readings from Last 6 Encounters:   10/17/24 161 lb (73 kg)   09/27/24 167 lb (75.8 kg)   06/14/24 167 lb (75.8 kg)   03/11/24 167 lb (75.8 kg)   12/04/23 167 lb (75.8 kg)   10/12/23 163 lb (73.9 kg)       Highly recommend to lose weight.  Discussed good dietary and eating habits as well as increasing vegetable and fruit intake.  Recommending avoiding foods high in fat content.  Recommend exercising at least 30-40 minutes 5-6 days a week.  Avoid skipping meals.  Making healthy choices for snacks and also limiting sugary beverages.      10. 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  Continue with current treatment plan.  Further testing ordered.  Imaging studies ordered.  Lab work ordered.  Prescription medication ordered.  Reinforced healthy diet, lifestyle, and exercise.      No follow-ups on file.     Darlene Kirby MD, 10/17/2024     Supplementary Documentation:   General Health:  In the past six months, have you lost more than 10 pounds without trying?: 2 - No  Has your appetite been poor?: No  Type of Diet: Balanced  How does the patient maintain a good energy level?: Other (fitness center  5 days a week)  How would you describe your daily physical activity?: Moderate  How would you describe your current health state?: Good  How do you maintain positive mental well-being?:  (reading and taking care of granddaughter)  On a scale of 0 to 10, with 0 being no pain and 10 being severe pain, what is your pain level?: 0 - (None)  In the past six months, have you experienced urine leakage?: 0-No  At any time do you feel concerned for the safety/well-being of yourself and/or your children, in your home or elsewhere?: No  Have you had any immunizations at another office such as Influenza, Hepatitis B, Tetanus, or Pneumococcal?: Yes (flu shot and covid shot thru pharmacy)    Health Maintenance   Topic Date Due    Colorectal Cancer Screening  Never done    Mammogram  Never done    Zoster Vaccines (1 of 2) Never done    DEXA Scan  Never done    COVID-19 Vaccine (5 - 2023-24 season) 09/01/2024    Influenza Vaccine (1) 10/01/2024    Annual Physical  10/12/2024    Annual Depression Screening  Completed    Fall Risk Screening (Annual)  Completed    Pneumococcal Vaccine: 65+ Years  Completed

## 2024-12-30 ENCOUNTER — OFFICE VISIT (OUTPATIENT)
Dept: UROLOGY | Facility: HOSPITAL | Age: 69
End: 2024-12-30
Attending: PHYSICIAN ASSISTANT
Payer: MEDICARE

## 2024-12-30 VITALS — RESPIRATION RATE: 16 BRPM | HEIGHT: 63 IN | WEIGHT: 161 LBS | BODY MASS INDEX: 28.53 KG/M2

## 2024-12-30 DIAGNOSIS — N90.4 VULVAR DYSTROPHY: ICD-10-CM

## 2024-12-30 DIAGNOSIS — N99.3 PROLAPSE OF VAGINAL VAULT AFTER HYSTERECTOMY: Primary | ICD-10-CM

## 2024-12-30 DIAGNOSIS — N95.2 POSTMENOPAUSAL ATROPHIC VAGINITIS: ICD-10-CM

## 2024-12-30 DIAGNOSIS — N81.84 PELVIC MUSCLE WASTING: ICD-10-CM

## 2024-12-30 PROCEDURE — 99212 OFFICE O/P EST SF 10 MIN: CPT

## 2024-12-30 NOTE — PROGRESS NOTES
Pt presents to follow up bulge  Doing well with #4 longstem gellhorn pessary, office care  Reports pessary is comfortable   Denies discharge  Denies bleeding  Denies s/sx of UTI  Bowels regular   Pt is using vaginal estrogen cream twice weekly  Using clobetasol twice a week     Happy with pessary, feels supported  She is not currently interested in surgical management of her pelvic organ prolapse.    Urogynecology Summary:  Urogynecology Summary  Prolapse: Yes  MALU: No  Urge Incontinence: No  Nocturia Frequency: 1  Frequency: Greater than 3 hours  Incomplete emptying: No  Constipation: No  Activities are limited by UI/POP?: No  Currently Sexually Active: No      Vitals:  Vitals:    12/30/24 0846   Resp: 16       GENERAL EXAM:  GENERAL:  NAD  HEAD: NC/AT  EYES: symmetric bilaterally. No icterus. Sclera w/o injection  NECK: no masses  LUNGS:  Normal resp effort  ABDOMEN:  Soft, no mass  MUSK: normal gait, ROM wnl. No edema  PSYCH: A&Ox3. Recent and remote memory intact    PELVIC EXAM: LYSSA Contreras, present for exam as a chaperone  Ext. Gen: +atrophy, bilateral red lesions on superior labia  Urethra: +atrophy, nontender  Bladder:+fullness, nontender  Vagina: +atrophy, no lesions, no bleeding   Cervix: no bleeding, no lesions, slight erythema, nontender  Uterus: absent  Perineum: nontender  Anus: wnl  Rectum: defer     PELVIC SUPPORT:  [place]:  3 (cervix to introitus)  Ant:  3  Post:  2    Her pessary was removed, cleaned, and reinserted w/o difficulty    Impression/Plan:    ICD-10-CM    1. Prolapse of vaginal vault after hysterectomy  N99.3       2. Postmenopausal atrophic vaginitis  N95.2       3. Pelvic muscle wasting  N81.84       4. 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 Reviewed associated benefits, risks, alternatives, and goals. Recommend low dose 2-3x weekly mgmt   Discussed management of pelvic organ prolapse including but not  limited to behavioral modifications, conservative options, and surgical management.   Discussed pessary management including benefits and risks. Discussed importance of keeping regularly scheduled pessary checks in prevention of complications related to pessary use.     Continue pessary management, office care  Continue vag estrogen 2-3x weekly  Continue clobetasol as prescribed   Daily pelvic exercises  Bowel management reviewed  Call with s/sx of UTI, problems with pessary   All questions answered  Pt understands and agrees to plan       Return in about 3 months (around 3/30/2025) for pessary care, sooner prn .      Maria Del Carmen Earl PA-C      The 21st Century Cures Act makes medical notes like these available to patients in the interest of transparency. However, be advised this is a medical document. It is intended as peer to peer communication. It is written in medical language and may contain abbreviations or verbiage that are unfamiliar. It may appear blunt or direct. Medical documents are intended to carry relevant information, facts as evident, and the clinical opinion of the practitioner.

## 2025-04-07 ENCOUNTER — OFFICE VISIT (OUTPATIENT)
Dept: UROLOGY | Facility: HOSPITAL | Age: 70
End: 2025-04-07
Attending: PHYSICIAN ASSISTANT
Payer: MEDICARE

## 2025-04-07 VITALS — RESPIRATION RATE: 18 BRPM | BODY MASS INDEX: 29 KG/M2 | WEIGHT: 161 LBS

## 2025-04-07 DIAGNOSIS — N81.84 PELVIC MUSCLE WASTING: ICD-10-CM

## 2025-04-07 DIAGNOSIS — N99.3 PROLAPSE OF VAGINAL VAULT AFTER HYSTERECTOMY: Primary | ICD-10-CM

## 2025-04-07 DIAGNOSIS — N95.2 POSTMENOPAUSAL ATROPHIC VAGINITIS: ICD-10-CM

## 2025-04-07 DIAGNOSIS — N90.4 VULVAR DYSTROPHY: ICD-10-CM

## 2025-04-07 PROCEDURE — 99212 OFFICE O/P EST SF 10 MIN: CPT

## 2025-04-07 NOTE — PROGRESS NOTES
Pt presents to follow up bulge  Doing well with #4 longstem gellhorn pessary, office care  Reports pessary is comfortable   Denies discharge  Denies bleeding  Denies s/sx of UTI  Bowels regular   Pt is using vaginal estrogen cream twice weekly  Using clobetasol twice a week     Happy with pessary, feels supported  She is not currently interested in surgical management of her pelvic organ prolapse.    Urogynecology Summary:  Urogynecology Summary  Prolapse: Yes  MALU: No  Urge Incontinence: Yes (Reports once at night if waits too long)  Nocturia Frequency: 1  Frequency: Greater than 3 hours (3 to 4)  Incomplete emptying: No  Constipation: No  Wears pad day?: 1  Wears Pad Night?: 1  Activities are limited by UI/POP?: No  Currently Sexually Active: No      Vitals:  Vitals:    04/07/25 0854   Resp: 18       GENERAL EXAM:  GENERAL:  NAD  HEAD: NC/AT  EYES: symmetric bilaterally. No icterus. Sclera w/o injection  NECK: no masses  LUNGS:  Normal resp effort  ABDOMEN:  Soft, no mass  MUSK: normal gait, ROM wnl. No edema  PSYCH: A&Ox3. Recent and remote memory intact    PELVIC EXAM: RN, present for exam as a chaperone  Ext. Gen: +atrophy, bilateral red lesions on superior labia  Urethra: +atrophy, nontender  Bladder:+fullness, nontender  Vagina: +atrophy, no lesions, no bleeding   Cervix: no bleeding, no lesions, slight erythema, nontender  Uterus: absent  Perineum: nontender  Anus: wnl  Rectum: defer     PELVIC SUPPORT:  [place]:  3 (cervix to introitus)  Ant:  3  Post:  2    Her pessary was removed, cleaned, and reinserted w/o difficulty    Impression/Plan:    ICD-10-CM    1. Prolapse of vaginal vault after hysterectomy  N99.3       2. Postmenopausal atrophic vaginitis  N95.2       3. Pelvic muscle wasting  N81.84       4. 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 Reviewed associated benefits, risks, alternatives, and goals. Recommend low dose  2-3x weekly mgmt   Discussed management of pelvic organ prolapse including but not limited to behavioral modifications, conservative options, and surgical management.   Discussed pessary management including benefits and risks. Discussed importance of keeping regularly scheduled pessary checks in prevention of complications related to pessary use.   Discussed management options for vulvar dystrophy. Discussed chronic nature of symptoms. Discussed steroid treatments and vulvar care, discussed associated treatment risks and benefits.   Discussed need for future vulvar biopsy if sx persist despite mgmt as prescribed.       Continue pessary management, office care  Continue vag estrogen 2-3x weekly  Daily pelvic exercises  Bowel management reviewed  Call with s/sx of UTI, problems with pessary   All questions answered  Pt understands and agrees to plan       Return in about 3 months (around 7/7/2025) for pessary care, sooner prn .      Maria Del Carmen Earl PA-C      The 21st Century Cures Act makes medical notes like these available to patients in the interest of transparency. However, be advised this is a medical document. It is intended as peer to peer communication. It is written in medical language and may contain abbreviations or verbiage that are unfamiliar. It may appear blunt or direct. Medical documents are intended to carry relevant information, facts as evident, and the clinical opinion of the practitioner.

## 2025-06-14 DIAGNOSIS — E78.00 HYPERCHOLESTEREMIA: ICD-10-CM

## 2025-06-16 RX ORDER — ATORVASTATIN CALCIUM 10 MG/1
10 TABLET, FILM COATED ORAL NIGHTLY
Qty: 90 TABLET | Refills: 3 | OUTPATIENT
Start: 2025-06-16

## 2025-07-14 ENCOUNTER — OFFICE VISIT (OUTPATIENT)
Dept: UROLOGY | Facility: HOSPITAL | Age: 70
End: 2025-07-14
Attending: PHYSICIAN ASSISTANT
Payer: MEDICARE

## 2025-07-14 VITALS — WEIGHT: 161 LBS | RESPIRATION RATE: 18 BRPM | HEIGHT: 63 IN | BODY MASS INDEX: 28.53 KG/M2

## 2025-07-14 DIAGNOSIS — N99.3 PROLAPSE OF VAGINAL VAULT AFTER HYSTERECTOMY: Primary | ICD-10-CM

## 2025-07-14 DIAGNOSIS — N90.4 VULVAR DYSTROPHY: ICD-10-CM

## 2025-07-14 DIAGNOSIS — N95.2 POSTMENOPAUSAL ATROPHIC VAGINITIS: ICD-10-CM

## 2025-07-14 DIAGNOSIS — N81.84 PELVIC MUSCLE WASTING: ICD-10-CM

## 2025-07-14 PROCEDURE — 99212 OFFICE O/P EST SF 10 MIN: CPT

## 2025-07-14 NOTE — PROGRESS NOTES
Pt presents to follow up bulge  Doing well with #4 longstem gellhorn pessary, office care  Reports pessary is comfortable   Denies discharge  Denies bleeding  Denies s/sx of UTI  Bowels regular   Pt is using vaginal estrogen cream twice weekly  Using clobetasol twice a week      Happy with pessary, feels supported  She is not currently interested in surgical management of her pelvic organ prolapse.    Urogynecology Summary:  Urogynecology Summary  Prolapse: No  MALU: No  Urge Incontinence: No  Nocturia Frequency: 1 (not every night)  Frequency: Greater than 3 hours  Incomplete emptying: No  Constipation: No  Wears pad day?: 1  Activities are limited by UI/POP?: No      Vitals:  Vitals:    07/14/25 0901   Resp: 18       GENERAL EXAM:  GENERAL:  NAD  HEAD: NC/AT  EYES: symmetric bilaterally. No icterus. Sclera w/o injection  NECK: no masses  LUNGS:  Normal resp effort  ABDOMEN:  Soft, no mass  MUSK: normal gait, ROM wnl. No edema  PSYCH: A&Ox3. Recent and remote memory intact    PELVIC EXAM: LYSSA Contreras, present for exam as a chaperone  Ext. Gen: +atrophy, bilateral red lesions on superior labia  Urethra: +atrophy, nontender  Bladder:+fullness, nontender  Vagina: +atrophy, no lesions, no bleeding   Cervix: no bleeding, no lesions, slight erythema, nontender  Uterus: absent  Perineum: nontender  Anus: wnl  Rectum: defer     PELVIC SUPPORT:  Girard:  3 (cervix to introitus)  Ant:  3  Post:  2    Her pessary was removed, cleaned, and reinserted w/o difficulty    Impression/Plan:    ICD-10-CM    1. Prolapse of vaginal vault after hysterectomy  N99.3       2. Postmenopausal atrophic vaginitis  N95.2       3. Pelvic muscle wasting  N81.84       4. 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 Reviewed associated benefits, risks, alternatives, and goals. Recommend low dose 2-3x weekly mgmt   Discussed management of pelvic organ prolapse including but  not limited to behavioral modifications, conservative options, and surgical management.   Discussed pessary management including benefits and risks. Discussed importance of keeping regularly scheduled pessary checks in prevention of complications related to pessary use.   Discussed management options for vulvar dystrophy. Discussed chronic nature of symptoms. Discussed steroid treatments and vulvar care, discussed associated treatment risks and benefits.   Discussed need for future vulvar biopsy if sx persist despite mgmt as prescribed.        Continue pessary management,  office care  Continue vag estrogen 2-3x weekly  Continue clobetasol twice a week   Daily pelvic exercises  Bowel management reviewed  Call with s/sx of UTI, problems with pessary   All questions answered  Pt understands and agrees to plan       Return in about 3 months (around 10/14/2025) for pesary care, sooner prn .      Maria Del Carmen Earl PA-C      The 21st Century Cures Act makes medical notes like these available to patients in the interest of transparency. However, be advised this is a medical document. It is intended as peer to peer communication. It is written in medical language and may contain abbreviations or verbiage that are unfamiliar. It may appear blunt or direct. Medical documents are intended to carry relevant information, facts as evident, and the clinical opinion of the practitioner.

## (undated) NOTE — LETTER
11/9/2020              Iraida Cho         [address] [facility] [address]         Dear Vicky Newton,    [address] [facility] records indicate that the tests ordered for you by Felipe Thorpe. MD Kofi  have not been done.   If you have, in fact, already completed the t

## (undated) NOTE — Clinical Note
Asma - I saw Winsome Montenegro today with bulge. I've recommended bladder testing, consider pessary trial. I will work to manage her sx. I appreciate the opportunity to participate in her care.  Thanks, [employer]